# Patient Record
Sex: MALE | Race: WHITE | NOT HISPANIC OR LATINO | Employment: FULL TIME | URBAN - METROPOLITAN AREA
[De-identification: names, ages, dates, MRNs, and addresses within clinical notes are randomized per-mention and may not be internally consistent; named-entity substitution may affect disease eponyms.]

---

## 2024-11-04 ENCOUNTER — APPOINTMENT (OUTPATIENT)
Dept: RADIOLOGY | Facility: CLINIC | Age: 64
End: 2024-11-04
Payer: MEDICARE

## 2024-11-04 DIAGNOSIS — M54.2 CERVICALGIA: ICD-10-CM

## 2024-11-04 PROCEDURE — 72040 X-RAY EXAM NECK SPINE 2-3 VW: CPT

## 2024-11-04 PROCEDURE — 73030 X-RAY EXAM OF SHOULDER: CPT

## 2024-11-22 ENCOUNTER — EVALUATION (OUTPATIENT)
Dept: PHYSICAL THERAPY | Facility: CLINIC | Age: 64
End: 2024-11-22
Payer: MEDICARE

## 2024-11-22 DIAGNOSIS — G89.29 CHRONIC LEFT SHOULDER PAIN: ICD-10-CM

## 2024-11-22 DIAGNOSIS — G89.29 CHRONIC RIGHT SHOULDER PAIN: ICD-10-CM

## 2024-11-22 DIAGNOSIS — M54.2 CERVICALGIA: Primary | ICD-10-CM

## 2024-11-22 DIAGNOSIS — M25.511 CHRONIC RIGHT SHOULDER PAIN: ICD-10-CM

## 2024-11-22 DIAGNOSIS — M25.512 CHRONIC LEFT SHOULDER PAIN: ICD-10-CM

## 2024-11-22 PROCEDURE — 97110 THERAPEUTIC EXERCISES: CPT

## 2024-11-22 PROCEDURE — 97162 PT EVAL MOD COMPLEX 30 MIN: CPT

## 2024-11-22 RX ORDER — LISINOPRIL 20 MG/1
20 TABLET ORAL DAILY
COMMUNITY

## 2024-11-22 NOTE — PROGRESS NOTES
PT Evaluation     Today's date: 2024  Patient name: Catracho Singleton  : 1960  MRN: [id number]  Referring provider: WILLEM George  Dx:   Encounter Diagnosis     ICD-10-CM    1. Cervicalgia  M54.2       2. Chronic left shoulder pain  M25.512     G89.29       3. Chronic right shoulder pain  M25.511     G89.29                      Assessment  Impairments: abnormal or restricted ROM, activity intolerance, impaired physical strength, lacks appropriate home exercise program and pain with function    Assessment details: Catracho Singleton is a pleasant 64 y.o. male who presents with chronic R sided headaches, chronic bilateral shoulder pain, and reports of decreased mobility of his neck beginning about 6 months ago of insidious onset. He presents with decreased cervical extension and bilateral rotation ROM and decreased shoulder flexion and abduction ROM with pain. He had improved cervical extension and rotation ROM following 10 repetitions of cervical retraction in sitting. No other tests were done secondary to time, but will assess BUE strength and ROM, nerve tension, and cervical joint play next visit. These impairments are limiting him with sleeping more than 1-1.5 hours uninterrupted and turning his head while driving. He has a primary movement diagnosis of cervical hypomobility. These signs and symptoms are consistent with cervicalgia, bilateral shoulder pain, and cervicogenic headaches. He will benefit from skilled PT to address impairments and return to PLOF         Prognosis details: Positive prognostic indicators include positive attitude toward recovery, motivated to improve, high self-efficacy, good understanding of condition, realistic expectations.  Negative prognostic indicators include chronicity of symptoms, high symptom irritability    Goals  STG to be achieved in 4 weeks  Patient will have improved cervical extension ROM to 40 degrees.  Improved bilateral cervical rotation ROM to 75  degrees  Evaluate shoulder mobility and strength  Patient will be independent with HEP.    LTG to be achieved in 8 weeks  Patient will be able to turn his head better when driving  Patient will be able to sleep at least 3 hours at night time before pain wakes him up  Patient will be able to do physical labor with his upper body with less pain      Plan  Patient would benefit from: skilled physical therapy  Planned modality interventions: cryotherapy, thermotherapy: hydrocollator packs, TENS and low level laser therapy    Planned therapy interventions: home exercise program, functional ROM exercises, body mechanics training, joint mobilization, manual therapy, neuromuscular re-education, therapeutic exercise, therapeutic activities, stretching, strengthening, flexibility and patient/caregiver education    Frequency: 1-2x/week.  Duration in weeks: 8  Plan of Care beginning date: 11/22/2024  Plan of Care expiration date: 1/17/2025  Treatment plan discussed with: patient      Subjective Evaluation    History of Present Illness  Date of onset: 5/22/2024  Mechanism of injury: Patient reports insidious onset of bilateral shoulder pain about 6 months ago. He denies much neck pain but does note trouble turning his head to look both ways while driving. He drives a lot for work. 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 No issues doing his job but notes not being able to lift like bench pressing, etc like he used to. Most of his pain is at night time. He is unable to sleep more than 1-1.5 hours before pain wakes him up. He notes he will sometimes have a headache that is right sided. He mainly wants to improve his pain so he can get better rest because it impacts his energy throughout the day    Denies numbness/tingling, chest pain, nausea/vomiting, recent infection, recent fever,  changes in vision, difficulty swallowing    11/4/2024 L shoulder x-ray report: Tiny osteophyte of the inferior glenoid margin with preserved glenohumeral joint space.  No fracture or dislocation.  Subtle tiny calcific density adjacent to the humeral head, which may indicate calcific tendinitis    11/4/2024: R shoulder x-ray report: Mild degenerative change of the glenohumeral joint.  Very mild acromioclavicular arthrosis.  No fracture or dislocation.    11/4/2024 cervical spine x-ray: No acute fracture or subluxation.   Reversal normal cervical lordosis with trace anterolisthesis C4-5 and retrolisthesis C5-6 and C6-7.  Advanced disc height loss C5-6 and C6-7 with vertebral body endplate spurring and multilevel facet and uncovertebral hypertrophy.          Patient Goals  Patient goal: Patient wants to relieve his pain so he can get better sleep and also wants to be able to turn his head better for driving  Pain  Relieving factors: change in position    Treatments  No previous or current treatments      Objective     Active Range of Motion   Cervical/Thoracic Spine       Cervical    Flexion: 40 degrees   Extension: 25 degrees      Left rotation: 55 degrees with pain  Right rotation: 55 degrees    with pain    Additional Active Range of Motion Details  Repeated seated cervical retraction:  -improved L rotation to 60 degrees  -improved R rotation to 65 degrees  -improved extension to 30 degrees  Mildly limited shoulder flexion and abduction AROM bilaterally    Strength/Myotome Testing     Additional Strength Details  Strength not tested today - will test next visit            Daily Treatment Diary     Precautions: none  Functional Limitations: sleeping - unable to sleep more than one hour to one and a half hours before being woken up by pain; unable to bench press/lift like he used to  Impairments: cervical extension ROM, bilateral cervical rotation ROM, shoulder flexion and abduction AROM  [employer] Code: [email or web address]    POC expiration: 1/17/2025  FOTO intake: 54  FOTO status:   FOTO predicted by visit 10: 70      POC Expires Reeval for Medicare to be completed  Unit Limit Auth Expiration Date PT/OT/STVisit Limit   1/17/2025 By visit 10 N/a BOMN BOMN    Completed on visit                    Auth Status DATE 11/22        NA Visit # 1         Remaining         MANUAL THERAPY         Supine cervical PA mobs grade 1/2         Supine SOR         Supine cervical distraction                  Prone thoracic mobs grade 2/3                  THERAPEUTIC EXERCISE HEP         UBE?          Seated cervical retraction 11/22 10        Seated cervical retraction ext          Seated thoracic ext w/ half foam roll                    Supine cervical retraction                    Sidelying open book                              Wall slides shld flex?                                                  NEUROMUSCULAR REEDUCATION                                                                                                                                                       THERAPEUTIC ACTIVITY                                                  GAIT TRAINING                                                  MODALITIES

## 2024-11-22 NOTE — LETTER
2024    Es Pascualyeison Chris   [address] [facility] [address]    Patient: Catracho Singleton   YOB: 1960   Date of Visit: 2024     Encounter Diagnosis     ICD-10-CM    1. Cervicalgia  M54.2       2. Chronic left shoulder pain  M25.512     G89.29       3. Chronic right shoulder pain  M25.511     G89.29           Dear Dr. Edison Chris:    Thank you for your recent referral of Catracho Singleton. Please review the attached evaluation summary from Catracho's recent visit.     Please verify that you agree with the plan of care by signing the attached order.     If you have any questions or concerns, please do not hesitate to call.     I sincerely appreciate the opportunity to share in the care of one of your patients and hope to have another opportunity to work with you in the near future.       Sincerely,    Christine Brumfield      Referring Provider:      I certify that I have read the below Plan of Care and certify the need for these services furnished under this plan of treatment while under my care.                    Es Edison Chris DO  [address] [facility] [address]  Via Fax: [phone]          PT Evaluation     Today's date: 2024  Patient name: Catracho Singleton  : 1960  MRN: [id number]  Referring provider: WILLEM George  Dx:   Encounter Diagnosis     ICD-10-CM    1. Cervicalgia  M54.2       2. Chronic left shoulder pain  M25.512     G89.29       3. Chronic right shoulder pain  M25.511     G89.29                      Assessment  Impairments: abnormal or restricted ROM, activity intolerance, impaired physical strength, lacks appropriate home exercise program and pain with function    Assessment details: Catracho Singleton is a pleasant 64 y.o. male who presents with chronic R sided headaches, chronic bilateral shoulder pain, and reports of decreased mobility of his neck beginning about 6 months ago of insidious onset. He  presents with decreased cervical extension and bilateral rotation ROM and decreased shoulder flexion and abduction ROM with pain. He had improved cervical extension and rotation ROM following 10 repetitions of cervical retraction in sitting. No other tests were done secondary to time, but will assess BUE strength and ROM, nerve tension, and cervical joint play next visit. These impairments are limiting him with sleeping more than 1-1.5 hours uninterrupted and turning his head while driving. He has a primary movement diagnosis of cervical hypomobility. These signs and symptoms are consistent with cervicalgia, bilateral shoulder pain, and cervicogenic headaches. He will benefit from skilled PT to address impairments and return to PLOF         Prognosis details: Positive prognostic indicators include positive attitude toward recovery, motivated to improve, high self-efficacy, good understanding of condition, realistic expectations.  Negative prognostic indicators include chronicity of symptoms, high symptom irritability    Goals  STG to be achieved in 4 weeks  Patient will have improved cervical extension ROM to 40 degrees.  Improved bilateral cervical rotation ROM to 75 degrees  Evaluate shoulder mobility and strength  Patient will be independent with HEP.    LTG to be achieved in 8 weeks  Patient will be able to turn his head better when driving  Patient will be able to sleep at least 3 hours at night time before pain wakes him up  Patient will be able to do physical labor with his upper body with less pain      Plan  Patient would benefit from: skilled physical therapy  Planned modality interventions: cryotherapy, thermotherapy: hydrocollator packs, TENS and low level laser therapy    Planned therapy interventions: home exercise program, functional ROM exercises, body mechanics training, joint mobilization, manual therapy, neuromuscular re-education, therapeutic exercise, therapeutic activities, stretching,  strengthening, flexibility and patient/caregiver education    Frequency: 1-2x/week.  Duration in weeks: 8  Plan of Care beginning date: 11/22/2024  Plan of Care expiration date: 1/17/2025  Treatment plan discussed with: patient      Subjective Evaluation    History of Present Illness  Date of onset: 5/22/2024  Mechanism of injury: Patient reports insidious onset of bilateral shoulder pain about 6 months ago. He denies much neck pain but does note trouble turning his head to look both ways while driving. He drives a lot for work. 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 No issues doing his job but notes not being able to lift like bench pressing, etc like he used to. Most of his pain is at night time. He is unable to sleep more than 1-1.5 hours before pain wakes him up. He notes he will sometimes have a headache that is right sided. He mainly wants to improve his pain so he can get better rest because it impacts his energy throughout the day    Denies numbness/tingling, chest pain, nausea/vomiting, recent infection, recent fever, changes in vision, difficulty swallowing    11/4/2024 L shoulder x-ray report: Tiny osteophyte of the inferior glenoid margin with preserved glenohumeral joint space.  No fracture or dislocation.  Subtle tiny calcific density adjacent to the humeral head, which may indicate calcific tendinitis    11/4/2024: R shoulder x-ray report: Mild degenerative change of the glenohumeral joint.  Very mild acromioclavicular arthrosis.  No fracture or dislocation.    11/4/2024 cervical spine x-ray: No acute fracture or subluxation.   Reversal normal cervical lordosis with trace anterolisthesis C4-5 and retrolisthesis C5-6 and C6-7.  Advanced disc height loss C5-6 and C6-7 with vertebral body endplate spurring and multilevel facet and uncovertebral  hypertrophy.          Patient Goals  Patient goal: Patient wants to relieve his pain so he can get better sleep and also wants to be able to turn his head better for driving  Pain  Relieving factors: change in position    Treatments  No previous or current treatments      Objective     Active Range of Motion   Cervical/Thoracic Spine       Cervical    Flexion: 40 degrees   Extension: 25 degrees      Left rotation: 55 degrees with pain  Right rotation: 55 degrees    with pain    Additional Active Range of Motion Details  Repeated seated cervical retraction:  -improved L rotation to 60 degrees  -improved R rotation to 65 degrees  -improved extension to 30 degrees  Mildly limited shoulder flexion and abduction AROM bilaterally    Strength/Myotome Testing     Additional Strength Details  Strength not tested today - will test next visit            Daily Treatment Diary     Precautions: none  Functional Limitations: sleeping - unable to sleep more than one hour to one and a half hours before being woken up by pain; unable to bench press/lift like he used to  Impairments: cervical extension ROM, bilateral cervical rotation ROM, shoulder flexion and abduction AROM  [facility] Code: [email or web address]   POC expiration: 1/17/2025  FOTO intake: 54  FOTO status:   FOTO predicted by visit 10: 70      POC Expires Reeval for Medicare to be completed  Unit Limit Auth Expiration Date PT/OT/STVisit Limit   1/17/2025 By visit 10 N/a BOMN BOMN    Completed on visit                    Auth Status DATE 11/22        NA Visit # 1         Remaining         MANUAL THERAPY         Supine cervical PA mobs grade 1/2         Supine SOR         Supine cervical distraction                  Prone thoracic mobs grade 2/3                  THERAPEUTIC EXERCISE HEP         UBE?          Seated cervical retraction 11/22 10        Seated cervical retraction ext          Seated thoracic ext w/ half foam roll                    Supine cervical retraction                     Sidelying open book                              Wall slides shld flex?                                                  NEUROMUSCULAR REEDUCATION                                                                                                                                                       THERAPEUTIC ACTIVITY                                                  GAIT TRAINING                                                  MODALITIES

## 2024-11-26 ENCOUNTER — EVALUATION (OUTPATIENT)
Dept: PHYSICAL THERAPY | Facility: CLINIC | Age: 64
End: 2024-11-26
Payer: MEDICARE

## 2024-11-26 DIAGNOSIS — G89.29 CHRONIC LEFT SHOULDER PAIN: ICD-10-CM

## 2024-11-26 DIAGNOSIS — M54.2 CERVICALGIA: Primary | ICD-10-CM

## 2024-11-26 DIAGNOSIS — M25.511 CHRONIC RIGHT SHOULDER PAIN: ICD-10-CM

## 2024-11-26 DIAGNOSIS — M25.512 CHRONIC LEFT SHOULDER PAIN: ICD-10-CM

## 2024-11-26 DIAGNOSIS — G89.29 CHRONIC RIGHT SHOULDER PAIN: ICD-10-CM

## 2024-11-26 PROCEDURE — 97110 THERAPEUTIC EXERCISES: CPT

## 2024-11-26 PROCEDURE — 97140 MANUAL THERAPY 1/> REGIONS: CPT

## 2024-11-26 NOTE — LETTER
2024    Es [address] Chris   [address] [facility] [address]    Patient: Catracho Singleton   YOB: 1960   Date of Visit: 2024     Encounter Diagnosis     ICD-10-CM    1. Cervicalgia  M54.2       2. Chronic left shoulder pain  M25.512     G89.29       3. Chronic right shoulder pain  M25.511     G89.29           Dear Dr. Edison Chris:    Thank you for your recent referral of Catracho Singleton. Please review the attached evaluation summary from Catracho's recent visit.     Please verify that you agree with the plan of care by signing the attached order.     If you have any questions or concerns, please do not hesitate to call.     I sincerely appreciate the opportunity to share in the care of one of your patients and hope to have another opportunity to work with you in the near future.       Sincerely,    Christine Brumfield      Referring Provider:      I certify that I have read the below Plan of Care and certify the need for these services furnished under this plan of treatment while under my care.                    Es Edison Chris DO  [address] [facility] [address]  Via Fax: [phone]          PT Re-Evaluation     Today's date: 2024  Patient name: Catracho Singleton  : 1960  MRN: [id number]  Referring provider: WILLEM George  Dx:   Encounter Diagnosis     ICD-10-CM    1. Cervicalgia  M54.2       2. Chronic left shoulder pain  M25.512     G89.29       3. Chronic right shoulder pain  M25.511     G89.29                      Assessment  Impairments: abnormal or restricted ROM, activity intolerance, impaired physical strength, lacks appropriate home exercise program and pain with function    Assessment details: Catracho Singleton is a pleasant 64 y.o. male who presents with chronic R sided headaches, chronic bilateral shoulder pain, and reports of decreased mobility of his neck beginning about 6 months ago of insidious onset. He  presents with decreased cervical extension and bilateral rotation ROM and decreased shoulder flexion and abduction ROM with pain. He had improved cervical extension and rotation ROM following 10 repetitions of cervical retraction in sitting. No other tests were done secondary to time, but will assess BUE strength and ROM, nerve tension, and cervical joint play next visit. These impairments are limiting him with sleeping more than 1-1.5 hours uninterrupted and turning his head while driving. He has a primary movement diagnosis of cervical hypomobility. These signs and symptoms are consistent with cervicalgia, bilateral shoulder pain, and cervicogenic headaches. He will benefit from skilled PT to address impairments and return to PLOF     11/26/2024: Patient had initial PT evaluation on 11/22/2024 and today's re-evaluation is a continuation of the initial evaluation. He reports feeling improved mobility in his neck but notes no change in his shoulder pain. Evaluated bilateral shoulder ROM and strength today. Note normal flexion ROM bilaterally. He has mildly limited and painfree L shoulder ROM. Note painful and limited R shoulder abduction ROM that improved by 30 degrees after AP mobilizations and MWM during abduction. He has weakness in shoulder abduction and ER bilaterally though a greater limitation in R shoulder with reproduction of pain. Cervical ROM improved after manual therapy and repeated cervical extension and thoracic extension. These impairments continue to limit him with sleeping and driving. He will benefit from skilled PT to address impairments and return to PLOF        Prognosis details: Positive prognostic indicators include positive attitude toward recovery, motivated to improve, high self-efficacy, good understanding of condition, realistic expectations.  Negative prognostic indicators include chronicity of symptoms, high symptom irritability    Goals  STG to be achieved in 4 weeks  Patient will  have improved cervical extension ROM to 40 degrees.(Ongoing)  Improved bilateral cervical rotation ROM to 75 degrees(ongoing)  Evaluate shoulder mobility and strength (met)  Patient will be independent with HEP.    LTG to be achieved in 8 weeks (all ongoing)  Patient will be able to turn his head better when driving  Patient will be able to sleep at least 3 hours at night time before pain wakes him up  Patient will be able to do physical labor with his upper body with less pain      Plan  Patient would benefit from: skilled physical therapy  Planned modality interventions: cryotherapy, thermotherapy: hydrocollator packs, TENS and low level laser therapy    Planned therapy interventions: home exercise program, functional ROM exercises, body mechanics training, joint mobilization, manual therapy, neuromuscular re-education, therapeutic exercise, therapeutic activities, stretching, strengthening, flexibility and patient/caregiver education    Frequency: 1-2x/week.  Duration in weeks: 8  Plan of Care beginning date: 11/22/2024  Plan of Care expiration date: 1/17/2025  Treatment plan discussed with: patient        Subjective Evaluation    History of Present Illness  Date of onset: 5/22/2024  Mechanism of injury: Patient reports insidious onset of bilateral shoulder pain about 6 months ago. He denies much neck pain but does note trouble turning his head to look both ways while driving. He drives a lot for work. 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 No issues doing his job but notes not being able to lift like bench pressing, etc like he used to. Most of his pain is at night time. He is unable to sleep more than 1-1.5 hours before pain wakes him up. He notes he will sometimes have a headache that is right sided. He mainly wants to improve his pain so he can get better rest  because it impacts his energy throughout the day    Denies numbness/tingling, chest pain, nausea/vomiting, recent infection, recent fever, changes in vision, difficulty swallowing    11/4/2024 L shoulder x-ray report: Tiny osteophyte of the inferior glenoid margin with preserved glenohumeral joint space.  No fracture or dislocation.  Subtle tiny calcific density adjacent to the humeral head, which may indicate calcific tendinitis    11/4/2024: R shoulder x-ray report: Mild degenerative change of the glenohumeral joint.  Very mild acromioclavicular arthrosis.  No fracture or dislocation.    11/4/2024 cervical spine x-ray: No acute fracture or subluxation.   Reversal normal cervical lordosis with trace anterolisthesis C4-5 and retrolisthesis C5-6 and C6-7.  Advanced disc height loss C5-6 and C6-7 with vertebral body endplate spurring and multilevel facet and uncovertebral hypertrophy.    11/26/2024: Patient reports he has been doing the neck exercise at least 3 times a day since his first PT appointment on 11/22/2024. Notes one night he got 3 hours of sleep before he woke up - notes he wasn't woken up by pain though. He notes the other nights were bad nights of sleep though. Feels the neck exercise does help and he is able to turn his head better      Patient Goals  Patient goal: Patient wants to relieve his pain so he can get better sleep and also wants to be able to turn his head better for driving  Pain  Relieving factors: change in position    Treatments  No previous or current treatments        Objective     Tenderness     Additional Tenderness Details  TTP to post scapula on R and anterior shoulder L     Active Range of Motion   Cervical/Thoracic Spine       Cervical    Flexion: 40 degrees   Extension: 25 degrees      Left rotation: 55 degrees with pain  Right rotation: 55 degrees    with pain  Left Shoulder   Flexion: 170 degrees   Abduction: 150 degrees   External rotation 90°: 78 degrees     Right Shoulder    Flexion: 170 degrees   Abduction: 135 degrees with pain  External rotation 45°: 55 degrees     Additional Active Range of Motion Details  Repeated seated cervical retraction:  -improved L rotation to 60 degrees  -improved R rotation to 65 degrees  -improved extension to 30 degrees  Improved R shoulder abduction AROM to 160 degrees following grade 3/4 AP GHJ mobilizations and GHJ AP MWM during abduction    Joint Play   Left Shoulder  Joints within functional limits are the posterior capsule.     Right Shoulder  Hypomobile in the posterior capsule.     Hypomobile: C2, C3, C4, C5, C6 and C7     Strength/Myotome Testing     Left Shoulder     Planes of Motion   Flexion: 5   Abduction: 4- (pain)   External rotation at 0°: 4 (pain)   Internal rotation at 0°: 5     Right Shoulder     Planes of Motion   Flexion: 5   Abduction: 3+ (pain)   External rotation at 0°: 3+ (pain)   Internal rotation at 0°: 5             Daily Treatment Diary     Precautions: none  Functional Limitations: sleeping - unable to sleep more than one hour to one and a half hours before being woken up by pain; unable to bench press/lift like he used to  Impairments: cervical extension ROM, bilateral cervical rotation ROM, shoulder flexion and abduction AROM  [employer] Code: [email or web address]   POC expiration: 1/17/2025  FOTO intake: 54  FOTO status:   FOTO predicted by visit 10: 70      POC Expires Reeval for Medicare to be completed  Unit Limit Auth Expiration Date PT/OT/STVisit Limit   1/17/2025 By visit 10 N/a BOMN BOMN    Completed on visit                    Auth Status DATE 11/22 11/26       NA Visit # 1 2        Remaining         MANUAL THERAPY         Supine cervical PA mobs grade 1/2  Gr 2/3  5'       Supine SOR  2'       Supine cervical distraction  2'                Prone thoracic mobs grade 2/3  nv                Supine R GHJ AP mob grade 3/4  4'       Supine R GHJ AP MWM abd  2'                THERAPEUTIC EXERCISE HEP         UBE?          Seated  "cervical retraction 11/22 10 10       Seated cervical retraction ext   10       Seated thoracic ext over table   10       Seated R shld abd MWM w/ AP mob   nv       Seated cervical rot SNAG w/ towel   10ea                 Sidelying open book   nv       Sidelying ER   Nv?       Standing scaption iso   Hold, p!       Wall slides shld flex?          Post capsule stretch cross body   nv       Doorway pec stretch   3x20\" ea                           Bent over row   nv       Bent over shld ext   nv       Bent over horz abd   Nv?                 Barbell row                              NEUROMUSCULAR REEDUCATION           Standing shld ER iso   5\"x10                           TB row          TB shld ext          TB ER                                                                                          THERAPEUTIC ACTIVITY                                                  GAIT TRAINING                                                  MODALITIES                                                         "

## 2024-11-26 NOTE — PROGRESS NOTES
PT Re-Evaluation     Today's date: 2024  Patient name: Catracho Singleton  : 1960  MRN: [id number]  Referring provider: WILLEM George  Dx:   Encounter Diagnosis     ICD-10-CM    1. Cervicalgia  M54.2       2. Chronic left shoulder pain  M25.512     G89.29       3. Chronic right shoulder pain  M25.511     G89.29                      Assessment  Impairments: abnormal or restricted ROM, activity intolerance, impaired physical strength, lacks appropriate home exercise program and pain with function    Assessment details: Catracho Singleton is a pleasant 64 y.o. male who presents with chronic R sided headaches, chronic bilateral shoulder pain, and reports of decreased mobility of his neck beginning about 6 months ago of insidious onset. He presents with decreased cervical extension and bilateral rotation ROM and decreased shoulder flexion and abduction ROM with pain. He had improved cervical extension and rotation ROM following 10 repetitions of cervical retraction in sitting. No other tests were done secondary to time, but will assess BUE strength and ROM, nerve tension, and cervical joint play next visit. These impairments are limiting him with sleeping more than 1-1.5 hours uninterrupted and turning his head while driving. He has a primary movement diagnosis of cervical hypomobility. These signs and symptoms are consistent with cervicalgia, bilateral shoulder pain, and cervicogenic headaches. He will benefit from skilled PT to address impairments and return to PLOF     2024: Patient had initial PT evaluation on 2024 and today's re-evaluation is a continuation of the initial evaluation. He reports feeling improved mobility in his neck but notes no change in his shoulder pain. Evaluated bilateral shoulder ROM and strength today. Note normal flexion ROM bilaterally. He has mildly limited and painfree L shoulder ROM. Note painful and limited R shoulder abduction ROM that improved by 30 degrees  after AP mobilizations and MWM during abduction. He has weakness in shoulder abduction and ER bilaterally though a greater limitation in R shoulder with reproduction of pain. Cervical ROM improved after manual therapy and repeated cervical extension and thoracic extension. These impairments continue to limit him with sleeping and driving. He will benefit from skilled PT to address impairments and return to PLOF        Prognosis details: Positive prognostic indicators include positive attitude toward recovery, motivated to improve, high self-efficacy, good understanding of condition, realistic expectations.  Negative prognostic indicators include chronicity of symptoms, high symptom irritability    Goals  STG to be achieved in 4 weeks  Patient will have improved cervical extension ROM to 40 degrees.(Ongoing)  Improved bilateral cervical rotation ROM to 75 degrees(ongoing)  Evaluate shoulder mobility and strength (met)  Patient will be independent with HEP.    LTG to be achieved in 8 weeks (all ongoing)  Patient will be able to turn his head better when driving  Patient will be able to sleep at least 3 hours at night time before pain wakes him up  Patient will be able to do physical labor with his upper body with less pain      Plan  Patient would benefit from: skilled physical therapy  Planned modality interventions: cryotherapy, thermotherapy: hydrocollator packs, TENS and low level laser therapy    Planned therapy interventions: home exercise program, functional ROM exercises, body mechanics training, joint mobilization, manual therapy, neuromuscular re-education, therapeutic exercise, therapeutic activities, stretching, strengthening, flexibility and patient/caregiver education    Frequency: 1-2x/week.  Duration in weeks: 8  Plan of Care beginning date: 11/22/2024  Plan of Care expiration date: 1/17/2025  Treatment plan discussed with: patient        Subjective Evaluation    History of Present Illness  Date of  onset: 5/22/2024  Mechanism of injury: Patient reports insidious onset of bilateral shoulder pain about 6 months ago. He denies much neck pain but does note trouble turning his head to look both ways while driving. He drives a lot for work. 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 No issues doing his job but notes not being able to lift like bench pressing, etc like he used to. Most of his pain is at night time. He is unable to sleep more than 1-1.5 hours before pain wakes him up. He notes he will sometimes have a headache that is right sided. He mainly wants to improve his pain so he can get better rest because it impacts his energy throughout the day    Denies numbness/tingling, chest pain, nausea/vomiting, recent infection, recent fever, changes in vision, difficulty swallowing    11/4/2024 L shoulder x-ray report: Tiny osteophyte of the inferior glenoid margin with preserved glenohumeral joint space.  No fracture or dislocation.  Subtle tiny calcific density adjacent to the humeral head, which may indicate calcific tendinitis    11/4/2024: R shoulder x-ray report: Mild degenerative change of the glenohumeral joint.  Very mild acromioclavicular arthrosis.  No fracture or dislocation.    11/4/2024 cervical spine x-ray: No acute fracture or subluxation.   Reversal normal cervical lordosis with trace anterolisthesis C4-5 and retrolisthesis C5-6 and C6-7.  Advanced disc height loss C5-6 and C6-7 with vertebral body endplate spurring and multilevel facet and uncovertebral hypertrophy.    11/26/2024: Patient reports he has been doing the neck exercise at least 3 times a day since his first PT appointment on 11/22/2024. Notes one night he got 3 hours of sleep before he woke up - notes he wasn't woken up by pain though. He notes the other nights were bad nights of sleep though.  Feels the neck exercise does help and he is able to turn his head better      Patient Goals  Patient goal: Patient wants to relieve his pain so he can get better sleep and also wants to be able to turn his head better for driving  Pain  Relieving factors: change in position    Treatments  No previous or current treatments        Objective     Tenderness     Additional Tenderness Details  TTP to post scapula on R and anterior shoulder L     Active Range of Motion   Cervical/Thoracic Spine       Cervical    Flexion: 40 degrees   Extension: 25 degrees      Left rotation: 55 degrees with pain  Right rotation: 55 degrees    with pain  Left Shoulder   Flexion: 170 degrees   Abduction: 150 degrees   External rotation 90°: 78 degrees     Right Shoulder   Flexion: 170 degrees   Abduction: 135 degrees with pain  External rotation 45°: 55 degrees     Additional Active Range of Motion Details  Repeated seated cervical retraction:  -improved L rotation to 60 degrees  -improved R rotation to 65 degrees  -improved extension to 30 degrees  Improved R shoulder abduction AROM to 160 degrees following grade 3/4 AP GHJ mobilizations and GHJ AP MWM during abduction    Joint Play   Left Shoulder  Joints within functional limits are the posterior capsule.     Right Shoulder  Hypomobile in the posterior capsule.     Hypomobile: C2, C3, C4, C5, C6 and C7     Strength/Myotome Testing     Left Shoulder     Planes of Motion   Flexion: 5   Abduction: 4- (pain)   External rotation at 0°: 4 (pain)   Internal rotation at 0°: 5     Right Shoulder     Planes of Motion   Flexion: 5   Abduction: 3+ (pain)   External rotation at 0°: 3+ (pain)   Internal rotation at 0°: 5             Daily Treatment Diary     Precautions: none  Functional Limitations: sleeping - unable to sleep more than one hour to one and a half hours before being woken up by pain; unable to bench press/lift like he used to  Impairments: cervical extension ROM, bilateral cervical  "rotation ROM, shoulder flexion and abduction AROM  [facility] Code: [email or web address]   POC expiration: 1/17/2025  FOTO intake: 54  FOTO status:   FOTO predicted by visit 10: 70      POC Expires Reeval for Medicare to be completed  Unit Limit Auth Expiration Date PT/OT/STVisit Limit   1/17/2025 By visit 10 N/a BOMN BOMN    Completed on visit                    Auth Status DATE 11/22 11/26       NA Visit # 1 2        Remaining         MANUAL THERAPY         Supine cervical PA mobs grade 1/2  Gr 2/3  5'       Supine SOR  2'       Supine cervical distraction  2'                Prone thoracic mobs grade 2/3  nv                Supine R GHJ AP mob grade 3/4  4'       Supine R GHJ AP MWM abd  2'                THERAPEUTIC EXERCISE HEP         UBE?          Seated cervical retraction 11/22 10 10       Seated cervical retraction ext   10       Seated thoracic ext over table   10       Seated R shld abd MWM w/ AP mob   nv       Seated cervical rot SNAG w/ towel   10ea                 Sidelying open book   nv       Sidelying ER   Nv?       Standing scaption iso   Hold, p!       Wall slides shld flex?          Post capsule stretch cross body   nv       Doorway pec stretch   3x20\" ea                           Bent over row   nv       Bent over shld ext   nv       Bent over horz abd   Nv?                 Barbell row                              NEUROMUSCULAR REEDUCATION           Standing shld ER iso   5\"x10                           TB row          TB shld ext          TB ER                                                                                          THERAPEUTIC ACTIVITY                                                  GAIT TRAINING                                                  MODALITIES                                         "

## 2024-11-29 ENCOUNTER — OFFICE VISIT (OUTPATIENT)
Dept: PHYSICAL THERAPY | Facility: CLINIC | Age: 64
End: 2024-11-29
Payer: MEDICARE

## 2024-11-29 DIAGNOSIS — G89.29 CHRONIC LEFT SHOULDER PAIN: ICD-10-CM

## 2024-11-29 DIAGNOSIS — M25.511 CHRONIC RIGHT SHOULDER PAIN: ICD-10-CM

## 2024-11-29 DIAGNOSIS — M54.2 CERVICALGIA: Primary | ICD-10-CM

## 2024-11-29 DIAGNOSIS — M25.512 CHRONIC LEFT SHOULDER PAIN: ICD-10-CM

## 2024-11-29 DIAGNOSIS — G89.29 CHRONIC RIGHT SHOULDER PAIN: ICD-10-CM

## 2024-11-29 PROCEDURE — 97140 MANUAL THERAPY 1/> REGIONS: CPT

## 2024-11-29 PROCEDURE — 97110 THERAPEUTIC EXERCISES: CPT

## 2024-11-29 NOTE — PROGRESS NOTES
Daily Note     Today's date: 2024  Patient name: Catracho Singleton  : 1960  MRN: [id number]  Referring provider: WILLEM George  Dx:   Encounter Diagnosis     ICD-10-CM    1. Cervicalgia  M54.2       2. Chronic left shoulder pain  M25.512     G89.29       3. Chronic right shoulder pain  M25.511     G89.29                      Subjective: Patient notes he felt fine after last visit. He was doing the stretch in the doorway the other day at home and it made his shoulder very sore - has held off on doing the exercise since.      Objective: See treatment diary below      Assessment: Patient tolerated treatment well overall. R shoulder flexion ROM is painfree WNLs but is limited in abduction primarily. Abduction improved with AP MWM and after AP glides and inferior glides. Added open books to HEP for thoracic mobility. Will benefit from skilled PT to address impairments and return to PLOF      Plan: assess response to HEP; continue progressing cervical ROM and R shoulder ROM     Daily Treatment Diary     Precautions: none  Functional Limitations: sleeping - unable to sleep more than one hour to one and a half hours before being woken up by pain; unable to bench press/lift like he used to  Impairments: cervical extension ROM, bilateral cervical rotation ROM, shoulder flexion and abduction AROM  [employer] Code: [email or web address]   POC expiration: 2025  FOTO intake: 54  FOTO status:   FOTO predicted by visit 10: 70      POC Expires Reeval for Medicare to be completed  Unit Limit Auth Expiration Date PT/OT/STVisit Limit   2025 By visit 10 N/a BOMN BOMN    Completed on visit                    Auth Status DATE       NA Visit # 1 2 3       Remaining         MANUAL THERAPY         Supine cervical PA mobs grade 1/2  Gr 2/3  5' Gr 2/3 5'      Supine SOR  2' 2'      Supine cervical distraction  2' 2'               Prone thoracic mobs grade 2/3  nv                Supine R GHJ AP mob grade 3/4   "4' 4' and inf glide      Supine R GHJ AP MWM abd  2' 4'               THERAPEUTIC EXERCISE HEP         UBE?          Seated cervical retraction 11/22 10 10       Seated cervical retraction ext   10       Seated thoracic ext over table   10       Seated R shld abd MWM w/ AP mob   nv       Seated cervical rot SNAG w/ towel   10ea                 Sidelying open book 11/29  nv 10ea      Sidelying ER   Nv? 2x10      Standing scaption iso   Hold, p!       Wall slides shld flex?          Post capsule stretch cross body   nv       Doorway pec stretch   3x20\" ea 2x20\"ea                           Bent over row   nv 15# 2x10      Bent over shld ext   nv nv      Bent over horz abd   Nv? nv                Barbell row                              NEUROMUSCULAR REEDUCATION           Standing shld ER iso   5\"x10 5\"x10                          TB row          TB shld ext          TB ER                                                                                          THERAPEUTIC ACTIVITY                                                  GAIT TRAINING                                                  MODALITIES                                        "

## 2024-12-03 ENCOUNTER — OFFICE VISIT (OUTPATIENT)
Dept: PHYSICAL THERAPY | Facility: CLINIC | Age: 64
End: 2024-12-03
Payer: MEDICARE

## 2024-12-03 DIAGNOSIS — M25.511 CHRONIC RIGHT SHOULDER PAIN: ICD-10-CM

## 2024-12-03 DIAGNOSIS — M54.2 CERVICALGIA: Primary | ICD-10-CM

## 2024-12-03 DIAGNOSIS — G89.29 CHRONIC RIGHT SHOULDER PAIN: ICD-10-CM

## 2024-12-03 DIAGNOSIS — G89.29 CHRONIC LEFT SHOULDER PAIN: ICD-10-CM

## 2024-12-03 DIAGNOSIS — M25.512 CHRONIC LEFT SHOULDER PAIN: ICD-10-CM

## 2024-12-03 PROCEDURE — 97110 THERAPEUTIC EXERCISES: CPT

## 2024-12-03 PROCEDURE — 97140 MANUAL THERAPY 1/> REGIONS: CPT

## 2024-12-03 NOTE — PROGRESS NOTES
Daily Note     Today's date: 12/3/2024  Patient name: Catracho Singleton  : 1960  MRN: [id number]  Referring provider: WILLEM George  Dx:   Encounter Diagnosis     ICD-10-CM    1. Cervicalgia  M54.2       2. Chronic left shoulder pain  M25.512     G89.29       3. Chronic right shoulder pain  M25.511     G89.29                      Subjective: Patient notes exercises are going better at home. He notes his neck is turning better but he is most limited by his R shoulder at this time. Notes last night was a rough night of sleep with the shoulder bothering him      Objective: See treatment diary below      Assessment: Patient tolerated treatment well overall. Improved R shoulder ROM after AP GHJ glides and MWM during abduction. Also had improved ROM following inferior glides. Initiated more scapular strengthening today. He will benefit from skilled PT to address impairments and return to PLOF      Plan: progress R shoulder ROM and scapular strength     Daily Treatment Diary     Precautions: none  Functional Limitations: sleeping - unable to sleep more than one hour to one and a half hours before being woken up by pain; unable to bench press/lift like he used to  Impairments: cervical extension ROM, bilateral cervical rotation ROM, shoulder flexion and abduction AROM  [facility] Code[facility] [email or web address]   POC expiration: 2025  FOTO intake: 54  FOTO status:   FOTO predicted by visit 10: 70      POC Expires Reeval for Medicare to be completed  Unit Limit Auth Expiration Date PT/OT/STVisit Limit   2025 By visit 10 N/a BOMN BOMN    Completed on visit                    Auth Status DATE  12/3     NA Visit # 1 2 3 4      Remaining         MANUAL THERAPY         Supine cervical PA mobs grade 1/2  Gr 2/3  5' Gr 2/3 5'      Supine SOR  2' 2'      Supine cervical distraction  2' 2'               Prone thoracic mobs grade 2/3  nv       Supine R shoulder PROM    4'     Supine R GHJ AP mob grade 3/4  4'  "4' and inf glide 5' and inf glide     Supine R GHJ AP MWM abd  2' 4' 4'              THERAPEUTIC EXERCISE HEP         UBE?          Seated cervical retraction 11/22 10 10       Seated cervical retraction ext   10       Seated thoracic ext over table   10       Seated R shld abd MWM w/ AP mob   nv       Seated cervical rot SNAG w/ towel   10ea                 Sidelying open book 11/29  nv 10ea 10ea     Sidelying ER   Nv? 2x10 2x10     Sidelying abd     2x10     Standing scaption iso   Hold, p!       Wall slides shld flex?          Post capsule stretch cross body   nv       Doorway pec stretch   3x20\" ea 2x20\"ea                           Bent over row   nv 15# 2x10 15# 2x10     Bent over shld ext   nv nv 7.5# 2x10     Bent over horz abd   Nv? nv      CC lat pulldown     110# 2x10     CC row     88# 2x10     Barbell row                              NEUROMUSCULAR REEDUCATION           Standing shld ER iso   5\"x10 5\"x10                          TB row          TB shld ext          TB ER          TB horz abd     MTB 2x10                                                                           THERAPEUTIC ACTIVITY                                                  GAIT TRAINING                                                  MODALITIES                                        "

## 2025-01-20 ENCOUNTER — APPOINTMENT (OUTPATIENT)
Dept: PHYSICAL THERAPY | Facility: CLINIC | Age: 65
End: 2025-01-20
Payer: MEDICARE

## 2025-01-23 ENCOUNTER — EVALUATION (OUTPATIENT)
Dept: PHYSICAL THERAPY | Facility: CLINIC | Age: 65
End: 2025-01-23
Payer: MEDICARE

## 2025-01-23 DIAGNOSIS — G89.29 CHRONIC RIGHT SHOULDER PAIN: Primary | ICD-10-CM

## 2025-01-23 DIAGNOSIS — G89.29 CHRONIC LEFT SHOULDER PAIN: ICD-10-CM

## 2025-01-23 DIAGNOSIS — M25.512 CHRONIC LEFT SHOULDER PAIN: ICD-10-CM

## 2025-01-23 DIAGNOSIS — M54.2 CERVICALGIA: ICD-10-CM

## 2025-01-23 DIAGNOSIS — M25.511 CHRONIC RIGHT SHOULDER PAIN: Primary | ICD-10-CM

## 2025-01-23 PROCEDURE — 97110 THERAPEUTIC EXERCISES: CPT

## 2025-01-23 PROCEDURE — 97140 MANUAL THERAPY 1/> REGIONS: CPT

## 2025-01-23 NOTE — PROGRESS NOTES
PT Re-Evaluation     Today's date: 2025  Patient name: Catracho Singleton  : 1960  MRN: [id number]  Referring provider: WILLEM George  Dx:   Encounter Diagnosis     ICD-10-CM    1. Chronic right shoulder pain  M25.511     G89.29       2. Chronic left shoulder pain  M25.512     G89.29       3. Cervicalgia  M54.2                        Assessment  Impairments: abnormal or restricted ROM, activity intolerance, impaired physical strength, lacks appropriate home exercise program and pain with function    Assessment details: Catracho Singleton is a pleasant 64 y.o. male who presents with chronic R sided headaches, chronic bilateral shoulder pain, and reports of decreased mobility of his neck beginning about 6 months ago of insidious onset. He presents with decreased cervical extension and bilateral rotation ROM and decreased shoulder flexion and abduction ROM with pain. He had improved cervical extension and rotation ROM following 10 repetitions of cervical retraction in sitting. No other tests were done secondary to time, but will assess BUE strength and ROM, nerve tension, and cervical joint play next visit. These impairments are limiting him with sleeping more than 1-1.5 hours uninterrupted and turning his head while driving. He has a primary movement diagnosis of cervical hypomobility. These signs and symptoms are consistent with cervicalgia, bilateral shoulder pain, and cervicogenic headaches. He will benefit from skilled PT to address impairments and return to PLOF     2024: Patient had initial PT evaluation on 2024 and today's re-evaluation is a continuation of the initial evaluation. He reports feeling improved mobility in his neck but notes no change in his shoulder pain. Evaluated bilateral shoulder ROM and strength today. Note normal flexion ROM bilaterally. He has mildly limited and painfree L shoulder ROM. Note painful and limited R shoulder abduction ROM that improved by 30  degrees after AP mobilizations and MWM during abduction. He has weakness in shoulder abduction and ER bilaterally though a greater limitation in R shoulder with reproduction of pain. Cervical ROM improved after manual therapy and repeated cervical extension and thoracic extension. These impairments continue to limit him with sleeping and driving. He will benefit from skilled PT to address impairments and return to PLOF    1/23/2025: Patient is resuming PT after a break in treatment due to work schedule. Re-assessed cervical spine and bilateral shoulders. He presents with decreased cervical ROM and decreased bilateral shoulder ROM and strength. Though he had a lapse in treatment, his ROM and strength is not any worse than it was upon IE. His impairments are limiting him with sleeping, turning his head while driving, and doing heavier lifting. He will benefit from skilled PT to address impairments and return to PLOF. He was reminded about the referral that I placed for him to see Dr. Casey for a consult for his shoulders.    Prognosis details: Positive prognostic indicators include positive attitude toward recovery, motivated to improve, high self-efficacy, good understanding of condition, realistic expectations.  Negative prognostic indicators include chronicity of symptoms, high symptom irritability    Goals  STG to be achieved in 4 weeks from 1/23  Patient will have improved cervical extension ROM to 40 degrees.(Ongoing)  Improved bilateral cervical rotation ROM to 75 degrees(ongoing)  Evaluate shoulder mobility and strength (met)  Patient will be independent with HEP.    LTG to be achieved in 8 weeks (all ongoing) - due to break in PT, continue all goals from 1/23  Patient will be able to turn his head better when driving  Patient will be able to sleep at least 3 hours at night time before pain wakes him up  Patient will be able to do physical labor with his upper body with less pain      Plan  Patient would  benefit from: skilled physical therapy  Planned modality interventions: cryotherapy, thermotherapy: hydrocollator packs, TENS and low level laser therapy    Planned therapy interventions: home exercise program, functional ROM exercises, body mechanics training, joint mobilization, manual therapy, neuromuscular re-education, therapeutic exercise, therapeutic activities, stretching, strengthening, flexibility and patient/caregiver education    Frequency: 1-2x week  Duration in weeks: 8  Plan of Care beginning date: 1/23/2025  Plan of Care expiration date: 3/20/2025  Treatment plan discussed with: patient and PTA        Subjective Evaluation    History of Present Illness  Date of onset: 5/22/2024  Mechanism of injury: Patient reports insidious onset of bilateral shoulder pain about 6 months ago. He denies much neck pain but does note trouble turning his head to look both ways while driving. He drives a lot for work. 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 No issues doing his job but notes not being able to lift like bench pressing, etc like he used to. Most of his pain is at night time. He is unable to sleep more than 1-1.5 hours before pain wakes him up. He notes he will sometimes have a headache that is right sided. He mainly wants to improve his pain so he can get better rest because it impacts his energy throughout the day    Denies numbness/tingling, chest pain, nausea/vomiting, recent infection, recent fever, changes in vision, difficulty swallowing    11/4/2024 L shoulder x-ray report: Tiny osteophyte of the inferior glenoid margin with preserved glenohumeral joint space.  No fracture or dislocation.  Subtle tiny calcific density adjacent to the humeral head, which may indicate calcific tendinitis    11/4/2024: R shoulder x-ray report: Mild degenerative change of the  glenohumeral joint.  Very mild acromioclavicular arthrosis.  No fracture or dislocation.    11/4/2024 cervical spine x-ray: No acute fracture or subluxation.   Reversal normal cervical lordosis with trace anterolisthesis C4-5 and retrolisthesis C5-6 and C6-7.  Advanced disc height loss C5-6 and C6-7 with vertebral body endplate spurring and multilevel facet and uncovertebral hypertrophy.    11/26/2024: Patient reports he has been doing the neck exercise at least 3 times a day since his first PT appointment on 11/22/2024. Notes one night he got 3 hours of sleep before he woke up - notes he wasn't woken up by pain though. He notes the other nights were bad nights of sleep though. Feels the neck exercise does help and he is able to turn his head better    1/23/2025: Patient notes he has been doing the exercises most days. He notes still having most shoulder pain when trying to sleep at night time. During the day he is able to do everything. Has not yet scheduled a consult with Dr. Casey as he forgot. Held on PT for a few weeks due to not knowing his work schedule in advance  Patient Goals  Patient goal: Patient wants to relieve his pain so he can get better sleep and also wants to be able to turn his head better for driving  Pain  At best pain rating: 3  Relieving factors: change in position    Treatments  No previous or current treatments        Objective     Tenderness     Additional Tenderness Details  TTP to post scapula on R and anterior shoulder L     Active Range of Motion   Cervical/Thoracic Spine       Cervical    Flexion: 50 degrees   Extension: 30 degrees      Left rotation: 65 degrees with pain  Right rotation: 60 degrees    with pain  Left Shoulder   Flexion: 170 degrees   Abduction: 155 degrees   External rotation 45°: 60 degrees   External rotation 90°: 78 degrees     Right Shoulder   Flexion: 170 degrees   Abduction: 145 degrees with pain  External rotation 45°: 60 degrees     Additional Active Range of  Motion Details  Improved R shoulder abduction AROM to 160 degrees following grade 3/4 AP GHJ mobilizations and GHJ AP MWM during abduction    Joint Play   Left Shoulder  Joints within functional limits are the posterior capsule.     Right Shoulder  Hypomobile in the posterior capsule.     Hypomobile: C2, C3, C4, C5, C6 and C7     Strength/Myotome Testing     Left Shoulder     Planes of Motion   Flexion: 5   Abduction: 4- (pain)   External rotation at 0°: 4 (pain)   Internal rotation at 0°: 5     Right Shoulder     Planes of Motion   Flexion: 4   Abduction: 3+ (pain)   External rotation at 0°: 3+ (pain)   Internal rotation at 0°: 5             Daily Treatment Diary     Precautions: none  Functional Limitations: sleeping - unable to sleep more than one hour to one and a half hours before being woken up by pain; unable to bench press/lift like he used to  Impairments: cervical extension ROM, bilateral cervical rotation ROM, shoulder flexion and abduction AROM  [facility] Code: [email or web address]   POC expiration: 3/20/2025  FOTO intake: 54  FOTO status:   FOTO predicted by visit 10: 70      POC Expires Reeval for Medicare to be completed  Unit Limit Auth Expiration Date PT/OT/STVisit Limit   3/20/2025 By visit 15 N/a BOMN BOMN    Completed on visit 5                   Auth Status DATE 11/29 12/3 1/23       NA Visit # 3 4 5        Remaining          MANUAL THERAPY          Supine cervical PA mobs grade 1/2 Gr 2/3 5'         Supine SOR 2'         Supine cervical distraction 2'                   Prone thoracic mobs grade 2/3          Supine R shoulder PROM  4' L/R 12'       Supine R GHJ AP mob grade 3/4 4' and inf glide 5' and inf glide L/R 8'       Supine R GHJ AP MWM abd 4' 4'                  THERAPEUTIC EXERCISE HEP          UBE?           Seated cervical retraction 11/22          Seated cervical retraction ext           Seated thoracic ext over table           Seated R shld abd MWM w/ AP mob           Seated cervical rot SNAG  "w/ towel                      Sidelying open book 11/29 10ea 10ea        Sidelying ER  2x10 2x10        Sidelying abd   2x10        Standing scaption iso           Wall slides shld flex?           Post capsule stretch cross body           Doorway pec stretch  2x20\"ea                                Bent over row  15# 2x10 15# 2x10        Bent over shld ext  nv 7.5# 2x10        Bent over horz abd  nv         CC lat pulldown   110# 2x10        CC row   88# 2x10        Barbell row                                 NEUROMUSCULAR REEDUCATION            Standing shld ER iso  5\"x10                               TB row           TB shld ext           TB ER           TB horz abd   MTB 2x10                                                                                     THERAPEUTIC ACTIVITY                                                       GAIT TRAINING                                                       MODALITIES                                              "

## 2025-01-23 NOTE — LETTER
2025    Es Pascualteradavidlyn Chris   [address] [facility] [address]    Patient: Catracho Singleton   YOB: 1960   Date of Visit: 2025     Encounter Diagnosis     ICD-10-CM    1. Chronic right shoulder pain  M25.511     G89.29       2. Chronic left shoulder pain  M25.512     G89.29       3. Cervicalgia  M54.2           Dear Dr. Edison Chris:    Thank you for your recent referral of Catracho iSngleton. Please review the attached evaluation summary from Catracho's recent visit.     Please verify that you agree with the plan of care by signing the attached order.     If you have any questions or concerns, please do not hesitate to call.     I sincerely appreciate the opportunity to share in the care of one of your patients and hope to have another opportunity to work with you in the near future.       Sincerely,    Christine Brumfield      Referring Provider:      I certify that I have read the below Plan of Care and certify the need for these services furnished under this plan of treatment while under my care.                    Es Edison Chris   [address] [facility] [address]  Via Fax: [phone]          PT Re-Evaluation     Today's date: 2025  Patient name: Catracho Singleton  : 1960  MRN: [id number]  Referring provider: WILLEM George  Dx:   Encounter Diagnosis     ICD-10-CM    1. Chronic right shoulder pain  M25.511     G89.29       2. Chronic left shoulder pain  M25.512     G89.29       3. Cervicalgia  M54.2                        Assessment  Impairments: abnormal or restricted ROM, activity intolerance, impaired physical strength, lacks appropriate home exercise program and pain with function    Assessment details: Catracho Singleton is a pleasant 64 y.o. male who presents with chronic R sided headaches, chronic bilateral shoulder pain, and reports of decreased mobility of his neck beginning about 6 months ago of insidious onset. He  presents with decreased cervical extension and bilateral rotation ROM and decreased shoulder flexion and abduction ROM with pain. He had improved cervical extension and rotation ROM following 10 repetitions of cervical retraction in sitting. No other tests were done secondary to time, but will assess BUE strength and ROM, nerve tension, and cervical joint play next visit. These impairments are limiting him with sleeping more than 1-1.5 hours uninterrupted and turning his head while driving. He has a primary movement diagnosis of cervical hypomobility. These signs and symptoms are consistent with cervicalgia, bilateral shoulder pain, and cervicogenic headaches. He will benefit from skilled PT to address impairments and return to PLOF     11/26/2024: Patient had initial PT evaluation on 11/22/2024 and today's re-evaluation is a continuation of the initial evaluation. He reports feeling improved mobility in his neck but notes no change in his shoulder pain. Evaluated bilateral shoulder ROM and strength today. Note normal flexion ROM bilaterally. He has mildly limited and painfree L shoulder ROM. Note painful and limited R shoulder abduction ROM that improved by 30 degrees after AP mobilizations and MWM during abduction. He has weakness in shoulder abduction and ER bilaterally though a greater limitation in R shoulder with reproduction of pain. Cervical ROM improved after manual therapy and repeated cervical extension and thoracic extension. These impairments continue to limit him with sleeping and driving. He will benefit from skilled PT to address impairments and return to PLOF    1/23/2025: Patient is resuming PT after a break in treatment due to work schedule. Re-assessed cervical spine and bilateral shoulders. He presents with decreased cervical ROM and decreased bilateral shoulder ROM and strength. Though he had a lapse in treatment, his ROM and strength is not any worse than it was upon IE. His impairments are  limiting him with sleeping, turning his head while driving, and doing heavier lifting. He will benefit from skilled PT to address impairments and return to PLOF. He was reminded about the referral that I placed for him to see Dr. Casey for a consult for his shoulders.    Prognosis details: Positive prognostic indicators include positive attitude toward recovery, motivated to improve, high self-efficacy, good understanding of condition, realistic expectations.  Negative prognostic indicators include chronicity of symptoms, high symptom irritability    Goals  STG to be achieved in 4 weeks from 1/23  Patient will have improved cervical extension ROM to 40 degrees.(Ongoing)  Improved bilateral cervical rotation ROM to 75 degrees(ongoing)  Evaluate shoulder mobility and strength (met)  Patient will be independent with HEP.    LTG to be achieved in 8 weeks (all ongoing) - due to break in PT, continue all goals from 1/23  Patient will be able to turn his head better when driving  Patient will be able to sleep at least 3 hours at night time before pain wakes him up  Patient will be able to do physical labor with his upper body with less pain      Plan  Patient would benefit from: skilled physical therapy  Planned modality interventions: cryotherapy, thermotherapy: hydrocollator packs, TENS and low level laser therapy    Planned therapy interventions: home exercise program, functional ROM exercises, body mechanics training, joint mobilization, manual therapy, neuromuscular re-education, therapeutic exercise, therapeutic activities, stretching, strengthening, flexibility and patient/caregiver education    Frequency: 1-2x week  Duration in weeks: 8  Plan of Care beginning date: 1/23/2025  Plan of Care expiration date: 3/20/2025  Treatment plan discussed with: patient and PTA        Subjective Evaluation    History of Present Illness  Date of onset: 5/22/2024  Mechanism of injury: Patient reports insidious onset of bilateral  shoulder pain about 6 months ago. He denies much neck pain but does note trouble turning his head to look both ways while driving. He drives a lot for work. 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 No issues doing his job but notes not being able to lift like bench pressing, etc like he used to. Most of his pain is at night time. He is unable to sleep more than 1-1.5 hours before pain wakes him up. He notes he will sometimes have a headache that is right sided. He mainly wants to improve his pain so he can get better rest because it impacts his energy throughout the day    Denies numbness/tingling, chest pain, nausea/vomiting, recent infection, recent fever, changes in vision, difficulty swallowing    11/4/2024 L shoulder x-ray report: Tiny osteophyte of the inferior glenoid margin with preserved glenohumeral joint space.  No fracture or dislocation.  Subtle tiny calcific density adjacent to the humeral head, which may indicate calcific tendinitis    11/4/2024: R shoulder x-ray report: Mild degenerative change of the glenohumeral joint.  Very mild acromioclavicular arthrosis.  No fracture or dislocation.    11/4/2024 cervical spine x-ray: No acute fracture or subluxation.   Reversal normal cervical lordosis with trace anterolisthesis C4-5 and retrolisthesis C5-6 and C6-7.  Advanced disc height loss C5-6 and C6-7 with vertebral body endplate spurring and multilevel facet and uncovertebral hypertrophy.    11/26/2024: Patient reports he has been doing the neck exercise at least 3 times a day since his first PT appointment on 11/22/2024. Notes one night he got 3 hours of sleep before he woke up - notes he wasn't woken up by pain though. He notes the other nights were bad nights of sleep though. Feels the neck exercise does help and he is able to turn his head  better    1/23/2025: Patient notes he has been doing the exercises most days. He notes still having most shoulder pain when trying to sleep at night time. During the day he is able to do everything. Has not yet scheduled a consult with Dr. Casey as he forgot. Held on PT for a few weeks due to not knowing his work schedule in advance  Patient Goals  Patient goal: Patient wants to relieve his pain so he can get better sleep and also wants to be able to turn his head better for driving  Pain  At best pain rating: 3  Relieving factors: change in position    Treatments  No previous or current treatments        Objective     Tenderness     Additional Tenderness Details  TTP to post scapula on R and anterior shoulder L     Active Range of Motion   Cervical/Thoracic Spine       Cervical    Flexion: 50 degrees   Extension: 30 degrees      Left rotation: 65 degrees with pain  Right rotation: 60 degrees    with pain  Left Shoulder   Flexion: 170 degrees   Abduction: 155 degrees   External rotation 45°: 60 degrees   External rotation 90°: 78 degrees     Right Shoulder   Flexion: 170 degrees   Abduction: 145 degrees with pain  External rotation 45°: 60 degrees     Additional Active Range of Motion Details  Improved R shoulder abduction AROM to 160 degrees following grade 3/4 AP GHJ mobilizations and GHJ AP MWM during abduction    Joint Play   Left Shoulder  Joints within functional limits are the posterior capsule.     Right Shoulder  Hypomobile in the posterior capsule.     Hypomobile: C2, C3, C4, C5, C6 and C7     Strength/Myotome Testing     Left Shoulder     Planes of Motion   Flexion: 5   Abduction: 4- (pain)   External rotation at 0°: 4 (pain)   Internal rotation at 0°: 5     Right Shoulder     Planes of Motion   Flexion: 4   Abduction: 3+ (pain)   External rotation at 0°: 3+ (pain)   Internal rotation at 0°: 5             Daily Treatment Diary     Precautions: none  Functional Limitations: sleeping - unable to sleep more  "than one hour to one and a half hours before being woken up by pain; unable to bench press/lift like he used to  Impairments: cervical extension ROM, bilateral cervical rotation ROM, shoulder flexion and abduction AROM  [facility] Code: [email or web address]   POC expiration: 3/20/2025  FOTO intake: 54  FOTO status:   FOTO predicted by visit 10: 70      POC Expires Reeval for Medicare to be completed  Unit Limit Auth Expiration Date PT/OT/STVisit Limit   3/20/2025 By visit 15 N/a BOMN BOMN    Completed on visit 5                   Auth Status DATE 11/29 12/3 1/23       NA Visit # 3 4 5        Remaining          MANUAL THERAPY          Supine cervical PA mobs grade 1/2 Gr 2/3 5'         Supine SOR 2'         Supine cervical distraction 2'                   Prone thoracic mobs grade 2/3          Supine R shoulder PROM  4' L/R 12'       Supine R GHJ AP mob grade 3/4 4' and inf glide 5' and inf glide L/R 8'       Supine R GHJ AP MWM abd 4' 4'                  THERAPEUTIC EXERCISE HEP          UBE?           Seated cervical retraction 11/22          Seated cervical retraction ext           Seated thoracic ext over table           Seated R shld abd MWM w/ AP mob           Seated cervical rot SNAG w/ towel                      Sidelying open book 11/29 10ea 10ea        Sidelying ER  2x10 2x10        Sidelying abd   2x10        Standing scaption iso           Wall slides shld flex?           Post capsule stretch cross body           Doorway pec stretch  2x20\"ea                                Bent over row  15# 2x10 15# 2x10        Bent over shld ext  nv 7.5# 2x10        Bent over horz abd  nv         CC lat pulldown   110# 2x10        CC row   88# 2x10        Barbell row                                 NEUROMUSCULAR REEDUCATION            Standing shld ER iso  5\"x10                               TB row           TB shld ext           TB ER           TB horz abd   MTB 2x10                                                                    "                  THERAPEUTIC ACTIVITY                                                       GAIT TRAINING                                                       MODALITIES

## 2025-01-28 ENCOUNTER — OFFICE VISIT (OUTPATIENT)
Dept: PHYSICAL THERAPY | Facility: CLINIC | Age: 65
End: 2025-01-28
Payer: MEDICARE

## 2025-01-28 DIAGNOSIS — M25.512 CHRONIC LEFT SHOULDER PAIN: ICD-10-CM

## 2025-01-28 DIAGNOSIS — M25.511 CHRONIC RIGHT SHOULDER PAIN: Primary | ICD-10-CM

## 2025-01-28 DIAGNOSIS — G89.29 CHRONIC RIGHT SHOULDER PAIN: Primary | ICD-10-CM

## 2025-01-28 DIAGNOSIS — G89.29 CHRONIC LEFT SHOULDER PAIN: ICD-10-CM

## 2025-01-28 PROCEDURE — 97140 MANUAL THERAPY 1/> REGIONS: CPT

## 2025-01-28 PROCEDURE — 97110 THERAPEUTIC EXERCISES: CPT

## 2025-01-28 NOTE — PROGRESS NOTES
Daily Note     Today's date: 2025  Patient name: Catracho Singleton  : 1960  MRN: [id number]  Referring provider: WILLEM George  Dx:   Encounter Diagnosis     ICD-10-CM    1. Chronic right shoulder pain  M25.511     G89.29       2. Chronic left shoulder pain  M25.512     G89.29                      Subjective: Pt reports he is becoming very frustrated with his shoulder pain as he has been unable to sleep well for quite some time now.       Objective: See treatment diary below      Assessment: Tolerated treatment well. Pt found benefit from extended manual therapy to stretch out b/l shoulders with improved performance of the TE's following. Patient demonstrated fatigue post treatment, exhibited good technique with therapeutic exercises, and would benefit from continued PT      Plan: Continue per plan of care.  Progress treatment as tolerated.       Daily Treatment Diary     Precautions: none  Functional Limitations: sleeping - unable to sleep more than one hour to one and a half hours before being woken up by pain; unable to bench press/lift like he used to  Impairments: cervical extension ROM, bilateral cervical rotation ROM, shoulder flexion and abduction AROM  [facility] [email or web address]   POC expiration: 3/20/2025  FOTO intake: 54  FOTO status:   FOTO predicted by visit 10: 70      POC Expires Reeval for Medicare to be completed  Unit Limit Auth Expiration Date PT/OT/STVisit Limit   3/20/2025 By visit 15 N/a BOMN BOMN    Completed on visit 5                   Auth Status DATE 11/29 12/3 1/23 1/28      NA Visit # 3 4 5 6       Remaining          MANUAL THERAPY          Supine cervical PA mobs grade 1/2 Gr 2/3 5'         Supine SOR 2'         Supine cervical distraction 2'                   Prone thoracic mobs grade 2/3          Supine R shoulder PROM  4' L/R 12' B/l 20'      Supine R GHJ AP mob grade 3/4 4' and inf glide 5' and inf glide L/R 8'       Supine R GHJ AP MWM abd 4' 4'                 "  THERAPEUTIC EXERCISE HEP          UBE?           Seated cervical retraction 11/22          Seated cervical retraction ext           Seated thoracic ext over table           Seated R shld abd MWM w/ AP mob           Seated cervical rot SNAG w/ towel                      Sidelying open book 11/29 10ea 10ea  X10 ea      Sidelying ER  2x10 2x10  X10 ea      Sidelying abd   2x10  X10 ea      Standing scaption iso           Wall slides shld flex?           Post capsule stretch cross body           Doorway pec stretch  2x20\"ea                                Bent over row  15# 2x10 15# 2x10  15#  10x2 ea      Bent over shld ext  nv 7.5# 2x10  7#  10x2 ea      Bent over horz abd  nv         CC lat pulldown   110# 2x10  110#  10x2      CC row   88# 2x10  110#  10x2      Barbell row                                 NEUROMUSCULAR REEDUCATION            Standing shld ER iso  5\"x10                               TB row           TB shld ext           TB ER           TB horz abd   MTB 2x10                                                                                     THERAPEUTIC ACTIVITY                                                       GAIT TRAINING                                                       MODALITIES                                             "

## 2025-01-30 ENCOUNTER — OFFICE VISIT (OUTPATIENT)
Dept: PHYSICAL THERAPY | Facility: CLINIC | Age: 65
End: 2025-01-30
Payer: MEDICARE

## 2025-01-30 DIAGNOSIS — G89.29 CHRONIC LEFT SHOULDER PAIN: ICD-10-CM

## 2025-01-30 DIAGNOSIS — M25.512 CHRONIC LEFT SHOULDER PAIN: ICD-10-CM

## 2025-01-30 DIAGNOSIS — M25.511 CHRONIC RIGHT SHOULDER PAIN: Primary | ICD-10-CM

## 2025-01-30 DIAGNOSIS — G89.29 CHRONIC RIGHT SHOULDER PAIN: Primary | ICD-10-CM

## 2025-01-30 PROCEDURE — 97110 THERAPEUTIC EXERCISES: CPT

## 2025-01-30 PROCEDURE — 97140 MANUAL THERAPY 1/> REGIONS: CPT

## 2025-01-30 NOTE — PROGRESS NOTES
Daily Note     Today's date: 2025  Patient name: Catracho Singleton  : 1960  MRN: [id number]  Referring provider: WILLEM George  Dx:   Encounter Diagnosis     ICD-10-CM    1. Chronic right shoulder pain  M25.511     G89.29       2. Chronic left shoulder pain  M25.512     G89.29                      Subjective: Pt reports he slept much better the night of his last visit.     Objective: See treatment diary below      Assessment: Tolerated treatment well. Relief with sleeping was noted after extensive stretching LV so same was replicated again today with some light strengthening after, which he tolerated well. Patient demonstrated fatigue post treatment, exhibited good technique with therapeutic exercises, and would benefit from continued PT      Plan: Continue per plan of care.  Progress treatment as tolerated.       Daily Treatment Diary     Precautions: none  Functional Limitations: sleeping - unable to sleep more than one hour to one and a half hours before being woken up by pain; unable to bench press/lift like he used to  Impairments: cervical extension ROM, bilateral cervical rotation ROM, shoulder flexion and abduction AROM  [facility] Code: [email or web address]   POC expiration: 3/20/2025  FOTO intake: 54  FOTO status:   FOTO predicted by visit 10: 70      POC Expires Reeval for Medicare to be completed  Unit Limit Auth Expiration Date PT/OT/STVisit Limit   3/20/2025 By visit 15 N/a BOMN BOMN    Completed on visit 5                   Auth Status DATE 11/29 12/3 1/23 1/28 1/30     NA Visit # 3 4 5 6 7      Remaining          MANUAL THERAPY          Supine cervical PA mobs grade 1/2 Gr 2/3 5'         Supine SOR 2'         Supine cervical distraction 2'                   Prone thoracic mobs grade 2/3          Supine R shoulder PROM  4' L/R 12' B/l 20' B/l 20'     Supine R GHJ AP mob grade 3/4 4' and inf glide 5' and inf glide L/R 8'       Supine R GHJ AP MWM abd 4' 4'                  THERAPEUTIC EXERCISE  "HEP          UBE?           Seated cervical retraction 11/22          Seated cervical retraction ext           Seated thoracic ext over table           Seated R shld abd MWM w/ AP mob           Seated cervical rot SNAG w/ towel                      Sidelying open book 11/29 10ea 10ea  X10 ea X10 ea     Sidelying ER  2x10 2x10  X10 ea X10 ea     Sidelying abd   2x10  X10 ea X10 ea     Standing scaption iso           Wall slides shld flex?           Post capsule stretch cross body           Doorway pec stretch  2x20\"ea                                Bent over row  15# 2x10 15# 2x10  15#  10x2 ea 15#  10x2 ea     Bent over shld ext  nv 7.5# 2x10  7#  10x2 ea 7#  10x2 ea     Bent over horz abd  nv         CC lat pulldown   110# 2x10  110#  10x2 110#  10x2     CC row   88# 2x10  110#  10x2 110#  10x2     Barbell row                                 NEUROMUSCULAR REEDUCATION            Standing shld ER iso  5\"x10                               TB row           TB shld ext           TB ER           TB horz abd   MTB 2x10   MTB  x20                                                                                  THERAPEUTIC ACTIVITY                                                       GAIT TRAINING                                                       MODALITIES                                             "

## 2025-02-03 ENCOUNTER — TELEPHONE (OUTPATIENT)
Dept: GASTROENTEROLOGY | Facility: CLINIC | Age: 65
End: 2025-02-03

## 2025-02-03 NOTE — TELEPHONE ENCOUNTER
Left message for patient to answer OA and ASC questions. Patient will need to be scheduled 02/21/2025. Prior colonoscopy report scanned into chart.

## 2025-02-04 ENCOUNTER — OFFICE VISIT (OUTPATIENT)
Dept: PHYSICAL THERAPY | Facility: CLINIC | Age: 65
End: 2025-02-04
Payer: MEDICARE

## 2025-02-04 DIAGNOSIS — G89.29 CHRONIC RIGHT SHOULDER PAIN: Primary | ICD-10-CM

## 2025-02-04 DIAGNOSIS — M25.512 CHRONIC LEFT SHOULDER PAIN: ICD-10-CM

## 2025-02-04 DIAGNOSIS — M54.2 CERVICALGIA: ICD-10-CM

## 2025-02-04 DIAGNOSIS — G89.29 CHRONIC LEFT SHOULDER PAIN: ICD-10-CM

## 2025-02-04 DIAGNOSIS — M25.511 CHRONIC RIGHT SHOULDER PAIN: Primary | ICD-10-CM

## 2025-02-04 PROCEDURE — 97110 THERAPEUTIC EXERCISES: CPT

## 2025-02-04 PROCEDURE — 97140 MANUAL THERAPY 1/> REGIONS: CPT

## 2025-02-04 NOTE — PROGRESS NOTES
Daily Note     Today's date: 2025  Patient name: Catracho Singleton  : 1960  MRN: [id number]  Referring provider: WILLEM George  Dx:   Encounter Diagnosis     ICD-10-CM    1. Chronic right shoulder pain  M25.511     G89.29       2. Chronic left shoulder pain  M25.512     G89.29       3. Cervicalgia  M54.2                      Subjective: Pt reports R shoulder is feeling a little better but L shoulder has been a little more sore at times.     Objective: See treatment diary below      Assessment: Patient tolerated treatment well overall. Note greater limitation in L shoulder PROM compared to R shoulder. Both improve well with manual therapy and mobilizations. Continued scapular strengthening today. Will benefit from skilled PT to address impairments and return to PLOF      Plan: Continue progressing bilateral shoulder ROM and strength     Daily Treatment Diary     Precautions: none  Functional Limitations: sleeping - unable to sleep more than one hour to one and a half hours before being woken up by pain; unable to bench press/lift like he used to  Impairments: cervical extension ROM, bilateral cervical rotation ROM, shoulder flexion and abduction AROM  [facility] Code: [email or web address]   POC expiration: 3/20/2025  FOTO intake: 54  FOTO status:   FOTO predicted by visit 10: 70      POC Expires Reeval for Medicare to be completed  Unit Limit Auth Expiration Date PT/OT/STVisit Limit   3/20/2025 By visit 15 N/a BOMN BOMN    Completed on visit 5                   Auth Status DATE 11/29 12/3 1/23 1/28 1/30 2/4    NA Visit # 3 4 5 6 7 8     Remaining          MANUAL THERAPY          Supine cervical PA mobs grade 1/2 Gr 2/3 5'         Supine SOR 2'         Supine cervical distraction 2'                   Prone thoracic mobs grade 2/3          Supine R shoulder PROM  4' L/R 12' B/l 20' B/l 20' B/L 15'    Supine R GHJ AP mob grade 3/4 4' and inf glide 5' and inf glide L/R 8'   5'    Supine R GHJ AP MWM abd 4' 4'       "            THERAPEUTIC EXERCISE HEP          UBE?           Seated cervical retraction 11/22          Seated cervical retraction ext           Seated thoracic ext over table           Seated R shld abd MWM w/ AP mob           Seated cervical rot SNAG w/ towel                      Sidelying open book 11/29 10ea 10ea  X10 ea X10 ea 10ea    Sidelying ER  2x10 2x10  X10 ea X10 ea 10ea    Sidelying abd   2x10  X10 ea X10 ea 10ea    Standing scaption iso           Wall slides shld flex?           Post capsule stretch cross body           Doorway pec stretch  2x20\"ea                                Bent over row  15# 2x10 15# 2x10  15#  10x2 ea 15#  10x2 ea 15# 2x10 ea    Bent over shld ext  nv 7.5# 2x10  7#  10x2 ea 7#  10x2 ea 7# 2x10ea    Bent over horz abd  nv         CC lat pulldown   110# 2x10  110#  10x2 110#  10x2 110# 2x10    CC row   88# 2x10  110#  10x2 110#  10x2 110# 2x10    Barbell row                                 NEUROMUSCULAR REEDUCATION            Standing shld ER iso  5\"x10                               TB row           TB shld ext           TB ER           TB horz abd   MTB 2x10   MTB  x20 MTB 20x                                                                                 THERAPEUTIC ACTIVITY                                                       GAIT TRAINING                                                       MODALITIES                                             "

## 2025-02-06 ENCOUNTER — APPOINTMENT (OUTPATIENT)
Dept: PHYSICAL THERAPY | Facility: CLINIC | Age: 65
End: 2025-02-06
Payer: MEDICARE

## 2025-02-07 ENCOUNTER — TELEPHONE (OUTPATIENT)
Age: 65
End: 2025-02-07

## 2025-02-07 NOTE — TELEPHONE ENCOUNTER
Left message for patient to answer OA and ASC questions. Patient will need to be scheduled 02/21/2025.

## 2025-02-07 NOTE — TELEPHONE ENCOUNTER
02/07/25  Screened by: Alida Paredes    Referring Provider 10yr Recall    Pre- Screening:     There is no height or weight on file to calculate BMI.  BMI - 32.0  Has patient been referred for a routine screening Colonoscopy? yes  Is the patient between 45-75 years old? yes  WEIGHT - 270  HEIGHT - 6'5      Previous Colonoscopy yes   If yes:    Date: 7/14/2014    Facility: [facility]    Reason: SCREENING        Does the patient want to see a Gastroenterologist prior to their procedure OR are they having any GI symptoms? no    Has the patient been hospitalized or had abdominal surgery in the past 6 months? no    Does the patient use supplemental oxygen? no    Does the patient take Coumadin, Lovenox, Plavix, Elliquis, Xarelto, or other blood thinning medication? Yes - on Lisinopril    Has the patient had a stroke, cardiac event, or stent placed in the past year? no        If patient is between 45yrs - 49yrs, please advise patient that we will have to confirm benefits & coverage with their insurance company for a routine screening colonoscopy.

## 2025-02-10 NOTE — TELEPHONE ENCOUNTER
Left message for patient to call back to confirm that he is not on a blood thinner--Lisinopril is not a blood thinner.

## 2025-02-11 ENCOUNTER — OFFICE VISIT (OUTPATIENT)
Dept: PHYSICAL THERAPY | Facility: CLINIC | Age: 65
End: 2025-02-11
Payer: MEDICARE

## 2025-02-11 DIAGNOSIS — G89.29 CHRONIC LEFT SHOULDER PAIN: ICD-10-CM

## 2025-02-11 DIAGNOSIS — M25.512 CHRONIC LEFT SHOULDER PAIN: ICD-10-CM

## 2025-02-11 DIAGNOSIS — G89.29 CHRONIC RIGHT SHOULDER PAIN: Primary | ICD-10-CM

## 2025-02-11 DIAGNOSIS — M25.511 CHRONIC RIGHT SHOULDER PAIN: Primary | ICD-10-CM

## 2025-02-11 PROCEDURE — 97110 THERAPEUTIC EXERCISES: CPT

## 2025-02-11 PROCEDURE — 97140 MANUAL THERAPY 1/> REGIONS: CPT

## 2025-02-11 NOTE — PROGRESS NOTES
Daily Note     Today's date: 2025  Patient name: Catracho Singleton  : 1960  MRN: [id number]  Referring provider: WILELM George  Dx:   Encounter Diagnosis     ICD-10-CM    1. Chronic right shoulder pain  M25.511     G89.29       2. Chronic left shoulder pain  M25.512     G89.29                      Subjective: Pt reports R shoulder is feeling a little better but L shoulder has been a little more sore at times.     Objective: See treatment diary below      Assessment:  Pt tolerated treatment well. Continued relief with sleeping was noted after  LV so same was replicated again today with some light strengthening after, which he tolerated well. Patient demonstrated fatigue post treatment, exhibited good technique with therapeutic exercises, and would benefit from continued PT . Will benefit from skilled PT to address impairments and return to PLOF      Plan: Continue progressing bilateral shoulder ROM and strength     Daily Treatment Diary     Precautions: none  Functional Limitations: sleeping - unable to sleep more than one hour to one and a half hours before being woken up by pain; unable to bench press/lift like he used to  Impairments: cervical extension ROM, bilateral cervical rotation ROM, shoulder flexion and abduction AROM  MedBridge Code: [email or web address]   POC expiration: 3/20/2025  FOTO intake: 54  FOTO status:   FOTO predicted by visit 10: 70      POC Expires Reeval for Medicare to be completed  Unit Limit Auth Expiration Date PT/OT/STVisit Limit   3/20/2025 By visit 15 N/a BOMN BOMN    Completed on visit 5                   Auth Status DATE 11/29 12/3 1/23 1/28 1/30 2/4 2/11   NA Visit # 3 4 5 6 7 8 9    Remaining          MANUAL THERAPY          Supine cervical PA mobs grade 1/2 Gr 2/3 5'         Supine SOR 2'         Supine cervical distraction 2'                   Prone thoracic mobs grade 2/3          Supine R shoulder PROM  4' L/R 12' B/l 20' B/l 20' B/L 15' B/l 20'   Supine R GHJ AP mob  "grade 3/4 4' and inf glide 5' and inf glide L/R 8'   5'    Supine R GHJ AP MWM abd 4' 4'                  THERAPEUTIC EXERCISE HEP          UBE?           Seated cervical retraction 11/22          Seated cervical retraction ext           Seated thoracic ext over table           Seated R shld abd MWM w/ AP mob           Seated cervical rot SNAG w/ towel                      Sidelying open book 11/29 10ea 10ea  X10 ea X10 ea 10ea X10 ea   Sidelying ER  2x10 2x10  X10 ea X10 ea 10ea X10 ea   Sidelying abd   2x10  X10 ea X10 ea 10ea X10 ea   Standing scaption iso           Wall slides shld flex?           Post capsule stretch cross body           Doorway pec stretch  2x20\"ea          AA Supine Flx OH with cane        x20              Bent over row  15# 2x10 15# 2x10  15#  10x2 ea 15#  10x2 ea 15# 2x10 ea 15# 2x10 ea   Bent over shld ext  nv 7.5# 2x10  7#  10x2 ea 7#  10x2 ea 7# 2x10ea 8# 2x10 ea   Bent over horz abd  nv         CC lat pulldown   110# 2x10  110#  10x2 110#  10x2 110# 2x10 110# 2x10   CC row   88# 2x10  110#  10x2 110#  10x2 110# 2x10 110# 2x10   Barbell row                                 NEUROMUSCULAR REEDUCATION            Standing shld ER iso  5\"x10                               TB row           TB shld ext           TB ER           TB horz abd   MTB 2x10   MTB  x20 MTB 20x MTB 20x                                                                                THERAPEUTIC ACTIVITY                                                       GAIT TRAINING                                                       MODALITIES                                             "

## 2025-02-12 NOTE — TELEPHONE ENCOUNTER
Left message for patient to call back and answer [facility] questions and schedule a colonoscopy. Patient was scheduled for an office visit because of taking Lisinopril, but it is not needed for this medication.

## 2025-02-13 ENCOUNTER — OFFICE VISIT (OUTPATIENT)
Dept: PHYSICAL THERAPY | Facility: CLINIC | Age: 65
End: 2025-02-13
Payer: MEDICARE

## 2025-02-13 ENCOUNTER — PREP FOR PROCEDURE (OUTPATIENT)
Age: 65
End: 2025-02-13

## 2025-02-13 DIAGNOSIS — Z12.11 SCREENING FOR COLON CANCER: Primary | ICD-10-CM

## 2025-02-13 DIAGNOSIS — G89.29 CHRONIC LEFT SHOULDER PAIN: ICD-10-CM

## 2025-02-13 DIAGNOSIS — G89.29 CHRONIC RIGHT SHOULDER PAIN: Primary | ICD-10-CM

## 2025-02-13 DIAGNOSIS — M25.511 CHRONIC RIGHT SHOULDER PAIN: Primary | ICD-10-CM

## 2025-02-13 DIAGNOSIS — M25.512 CHRONIC LEFT SHOULDER PAIN: ICD-10-CM

## 2025-02-13 PROCEDURE — 97110 THERAPEUTIC EXERCISES: CPT

## 2025-02-13 PROCEDURE — 97140 MANUAL THERAPY 1/> REGIONS: CPT

## 2025-02-13 NOTE — TELEPHONE ENCOUNTER
Pt returned our call to schedule his colonoscopy. Pt is not on any blood thinners. Please mail JEMIMA/DUL instructions as well as diabetic instructions to the pt.

## 2025-02-13 NOTE — TELEPHONE ENCOUNTER
Scheduled date of colonoscopy (as of today): 03/05/25  Physician performing colonoscopy: Jacquie  Location of colonoscopy: [facility]  Bowel prep reviewed with patient: JEMIMA/AGATA sent via mail  Instructions reviewed with patient by: SF  Clearances: N/A    : Ashkan Stoll

## 2025-02-13 NOTE — PROGRESS NOTES
Daily Note     Today's date: 2025  Patient name: Catracho Singleton  : 1960  MRN: [id number]  Referring provider: WILLEM George  Dx:   Encounter Diagnosis     ICD-10-CM    1. Chronic right shoulder pain  M25.511     G89.29       2. Chronic left shoulder pain  M25.512     G89.29                      Subjective: Pt reports shoulders continue to improve and he is sleeping better.     Objective: See treatment diary below      Assessment:  Pt tolerated treatment well. Pt continues to benefit from manual stretching with immediate relief post. Progressed strengthening TE's with no adverse effects . Will benefit from skilled PT to address impairments and return to PLOF      Plan: Continue progressing bilateral shoulder ROM and strength     Daily Treatment Diary     Precautions: none  Functional Limitations: sleeping - unable to sleep more than one hour to one and a half hours before being woken up by pain; unable to bench press/lift like he used to  Impairments: cervical extension ROM, bilateral cervical rotation ROM, shoulder flexion and abduction AROM  [facility] [email or web address]   POC expiration: 3/20/2025  FOTO intake: 54  FOTO status:   FOTO predicted by visit 10: 70      POC Expires Reeval for Medicare to be completed  Unit Limit Auth Expiration Date PT/OT/STVisit Limit   3/20/2025 By visit 15 N/a BOMN BOMN    Completed on visit 5                   Auth Status DATE  2   NA Visit # 6 7 8 9 10    Remaining        MANUAL THERAPY        Supine cervical PA mobs grade 1/2        Supine SOR        Supine cervical distraction                Prone thoracic mobs grade 2/3        Supine R shoulder PROM B/l 20' B/l 20' B/L 15' B/l 20' B/l 20'   Supine R GHJ AP mob grade 3/4   5'     Supine R GHJ AP MWM abd                THERAPEUTIC EXERCISE HEP        UBE?         Seated cervical retraction         Seated cervical retraction ext         Seated thoracic ext over table         Seated R  shld abd MWM w/ AP mob         Seated cervical rot SNAG w/ towel                  Sidelying open book 11/29 X10 ea X10 ea 10ea X10 ea X10 ea   Sidelying ER  X10 ea X10 ea 10ea X10 ea X10 ea   Sidelying abd  X10 ea X10 ea 10ea X10 ea X10 ea   Standing scaption iso         Wall slides shld flex?         Post capsule stretch cross body         Doorway pec stretch         AA Supine Flx OH with cane     x20 x20            Bent over row  15#  10x2 ea 15#  10x2 ea 15# 2x10 ea 15# 2x10 ea 15# 10x2 ea   Bent over shld ext  7#  10x2 ea 7#  10x2 ea 7# 2x10ea 8# 2x10 ea 8# 10x2 ea   Bent over horz abd         CC lat pulldown  110#  10x2 110#  10x2 110# 2x10 110# 2x10 110# 2x10   CC row  110#  10x2 110#  10x2 110# 2x10 110# 2x10 110# 2x10   Barbell row         OHP      5# 10x2 ea            NEUROMUSCULAR REEDUCATION          Standing shld ER iso                           TB row         TB shld ext         TB ER         TB horz abd   MTB  x20 MTB 20x MTB 20x MTB x20                                                                  THERAPEUTIC ACTIVITY                                             GAIT TRAINING                                             MODALITIES

## 2025-02-13 NOTE — TELEPHONE ENCOUNTER
Teresa DURAND    2/13/25 11:59 AM  Note      Scheduled date of colonoscopy (as of today): 03/05/25  Physician performing colonoscopy: Jacquie  Location of colonoscopy: [facility]  Bowel prep reviewed with patient: JEMIMA/AGATA sent via mail  Instructions reviewed with patient by: KIZZY  Clearances: N/A     : Ashkan Stoll

## 2025-02-18 ENCOUNTER — OFFICE VISIT (OUTPATIENT)
Dept: PHYSICAL THERAPY | Facility: CLINIC | Age: 65
End: 2025-02-18
Payer: MEDICARE

## 2025-02-18 DIAGNOSIS — G89.29 CHRONIC LEFT SHOULDER PAIN: ICD-10-CM

## 2025-02-18 DIAGNOSIS — G89.29 CHRONIC RIGHT SHOULDER PAIN: Primary | ICD-10-CM

## 2025-02-18 DIAGNOSIS — M25.512 CHRONIC LEFT SHOULDER PAIN: ICD-10-CM

## 2025-02-18 DIAGNOSIS — M54.2 CERVICALGIA: ICD-10-CM

## 2025-02-18 DIAGNOSIS — M25.511 CHRONIC RIGHT SHOULDER PAIN: Primary | ICD-10-CM

## 2025-02-18 PROCEDURE — 97110 THERAPEUTIC EXERCISES: CPT

## 2025-02-18 PROCEDURE — 97140 MANUAL THERAPY 1/> REGIONS: CPT

## 2025-02-18 NOTE — PROGRESS NOTES
Daily Note     Today's date: 2025  Patient name: Catracho Singleton  : 1960  MRN: [id number]  Referring provider: WILLEM George  Dx:   Encounter Diagnosis     ICD-10-CM    1. Chronic right shoulder pain  M25.511     G89.29       2. Chronic left shoulder pain  M25.512     G89.29       3. Cervicalgia  M54.2                      Subjective: Pt reports overall feeling about the same.    Objective: See treatment diary below      Assessment:  Pt tolerated treatment well overall. He does still have limited mobility primarily in bilateral abduction and flexion with mild pain. Continued working on scapular strengthening for shoulder AROM. He will benefit from skilled PT to address impairments and return to PLOF      Plan: Continue progressing bilateral shoulder ROM and strength     Daily Treatment Diary     Precautions: none  Functional Limitations: sleeping - unable to sleep more than one hour to one and a half hours before being woken up by pain; unable to bench press/lift like he used to  Impairments: cervical extension ROM, bilateral cervical rotation ROM, shoulder flexion and abduction AROM  MedBridge Code: [email or web address]   POC expiration: 3/20/2025  FOTO intake: 54  FOTO status:   FOTO predicted by visit 10: 70      POC Expires Reeval for Medicare to be completed  Unit Limit Auth Expiration Date PT/OT/STVisit Limit   3/20/2025 By visit 15 N/a BOMN BOMN    Completed on visit 5                   Auth Status DATE 2/4 2/11 2/13 2/15     NA Visit # 8 9 10 11      Remaining         MANUAL THERAPY         Supine cervical PA mobs grade 1/2         Supine SOR         Supine cervical distraction                  Prone thoracic mobs grade 2/3         Supine R shoulder PROM B/L 15' B/l 20' B/l 20' B/L 20'     Supine R GHJ AP mob grade 3/4 5'   4'     Supine R GHJ AP MWM abd                  THERAPEUTIC EXERCISE HEP         UBE?          Seated cervical retraction                    Sidelying open book  10ea  X10 ea X10 ea 10ea     Sidelying ER  10ea X10 ea X10 ea      Sidelying abd  10ea X10 ea X10 ea 10ea     Standing scaption iso          Wall slides shld flex?          Post capsule stretch cross body          Doorway pec stretch          AA Supine Flx OH with cane   x20 x20 nv               Bent over row  15# 2x10 ea 15# 2x10 ea 15# 10x2 ea 15# 2x10ea     Bent over shld ext  7# 2x10ea 8# 2x10 ea 8# 10x2 ea 8# 2x10ea     Bent over horz abd          CC lat pulldown  110# 2x10 110# 2x10 110# 2x10 121# 2x10     CC row  110# 2x10 110# 2x10 110# 2x10 121# 2x10     Barbell row          OHP    5# 10x2 ea MTB 20x               NEUROMUSCULAR REEDUCATION           Standing shld ER iso                              TB row          TB shld ext          TB ER          TB horz abd  MTB 20x MTB 20x MTB x20 MTB 20x                                                                           THERAPEUTIC ACTIVITY                                                  GAIT TRAINING                                                  MODALITIES

## 2025-02-19 ENCOUNTER — ANESTHESIA (OUTPATIENT)
Dept: ANESTHESIOLOGY | Facility: AMBULATORY SURGERY CENTER | Age: 65
End: 2025-02-19

## 2025-02-19 ENCOUNTER — ANESTHESIA EVENT (OUTPATIENT)
Dept: ANESTHESIOLOGY | Facility: AMBULATORY SURGERY CENTER | Age: 65
End: 2025-02-19

## 2025-02-20 ENCOUNTER — OFFICE VISIT (OUTPATIENT)
Dept: PHYSICAL THERAPY | Facility: CLINIC | Age: 65
End: 2025-02-20
Payer: MEDICARE

## 2025-02-20 DIAGNOSIS — M25.511 CHRONIC RIGHT SHOULDER PAIN: Primary | ICD-10-CM

## 2025-02-20 DIAGNOSIS — G89.29 CHRONIC LEFT SHOULDER PAIN: ICD-10-CM

## 2025-02-20 DIAGNOSIS — M25.512 CHRONIC LEFT SHOULDER PAIN: ICD-10-CM

## 2025-02-20 DIAGNOSIS — G89.29 CHRONIC RIGHT SHOULDER PAIN: Primary | ICD-10-CM

## 2025-02-20 PROCEDURE — 97110 THERAPEUTIC EXERCISES: CPT

## 2025-02-20 PROCEDURE — 97140 MANUAL THERAPY 1/> REGIONS: CPT

## 2025-02-20 NOTE — PROGRESS NOTES
Daily Note     Today's date: 2025  Patient name: Catracho Singleton  : 1960  MRN: [id number]  Referring provider: WILLEM George  Dx:   Encounter Diagnosis     ICD-10-CM    1. Chronic right shoulder pain  M25.511     G89.29       2. Chronic left shoulder pain  M25.512     G89.29                      Subjective: Pt reports he's making some overall progress.     Objective: See treatment diary below      Assessment:  Pt tolerated treatment well overall. Continued to work on improving his overall  b/l shoulder mobility which has some limitations, mostly with ABD and IR/ER.  He will benefit from skilled PT to address impairments and return to PLOF      Plan: Continue progressing bilateral shoulder ROM and strength     Daily Treatment Diary     Precautions: none  Functional Limitations: sleeping - unable to sleep more than one hour to one and a half hours before being woken up by pain; unable to bench press/lift like he used to  Impairments: cervical extension ROM, bilateral cervical rotation ROM, shoulder flexion and abduction AROM  MedBridge Code: [email or web address]   POC expiration: 3/20/2025  FOTO intake: 54  FOTO status:   FOTO predicted by visit 10: 70      POC Expires Reeval for Medicare to be completed  Unit Limit Auth Expiration Date PT/OT/STVisit Limit   3/20/2025 By visit 15 N/a BOMN BOMN    Completed on visit 5                   Auth Status DATE 2/4 2/11 2/13 2/15 2/20    NA Visit # 8 9 10 11 12     Remaining         MANUAL THERAPY         Supine cervical PA mobs grade 1/2         Supine SOR         Supine cervical distraction                  Prone thoracic mobs grade 2/3         Supine R shoulder PROM B/L 15' B/l 20' B/l 20' B/L 20' B/l  20'    Supine R GHJ AP mob grade 3/4 5'   4'     Supine R GHJ AP MWM abd                  THERAPEUTIC EXERCISE HEP         UBE?          Seated cervical retraction                    Sidelying open book  10ea X10 ea X10 ea 10ea X10 ea    Sidelying ER  10ea  X10 ea X10 ea      Sidelying abd  10ea X10 ea X10 ea 10ea X10 ea    Standing scaption iso          Wall slides shld flex?          Post capsule stretch cross body          Doorway pec stretch          AA Supine Flx OH with cane   x20 x20 nv X20              Bent over row  15# 2x10 ea 15# 2x10 ea 15# 10x2 ea 15# 2x10ea 15# 2x10ea    Bent over shld ext  7# 2x10ea 8# 2x10 ea 8# 10x2 ea 8# 2x10ea 8# 2x10ea    Bent over horz abd          CC lat pulldown  110# 2x10 110# 2x10 110# 2x10 121# 2x10 121# 2x10    CC row  110# 2x10 110# 2x10 110# 2x10 121# 2x10 121# 2x10    Barbell row          OHP    5# 10x2 ea MTB 20x 5#  10x2              NEUROMUSCULAR REEDUCATION           Standing shld ER iso                              TB row          TB shld ext          TB ER          TB horz abd  MTB 20x MTB 20x MTB x20 MTB 20x MTB  10x2                                                                          THERAPEUTIC ACTIVITY                                                  GAIT TRAINING                                                  MODALITIES

## 2025-02-24 ENCOUNTER — TELEPHONE (OUTPATIENT)
Dept: GASTROENTEROLOGY | Facility: CLINIC | Age: 65
End: 2025-02-24

## 2025-02-25 ENCOUNTER — OFFICE VISIT (OUTPATIENT)
Dept: PHYSICAL THERAPY | Facility: CLINIC | Age: 65
End: 2025-02-25
Payer: MEDICARE

## 2025-02-25 DIAGNOSIS — G89.29 CHRONIC RIGHT SHOULDER PAIN: Primary | ICD-10-CM

## 2025-02-25 DIAGNOSIS — M25.511 CHRONIC RIGHT SHOULDER PAIN: Primary | ICD-10-CM

## 2025-02-25 DIAGNOSIS — M25.512 CHRONIC LEFT SHOULDER PAIN: ICD-10-CM

## 2025-02-25 DIAGNOSIS — G89.29 CHRONIC LEFT SHOULDER PAIN: ICD-10-CM

## 2025-02-25 PROCEDURE — 97110 THERAPEUTIC EXERCISES: CPT

## 2025-02-25 PROCEDURE — 97140 MANUAL THERAPY 1/> REGIONS: CPT

## 2025-02-25 NOTE — PROGRESS NOTES
Daily Note     Today's date: 2025  Patient name: Catracho Singleton  : 1960  MRN: [id number]  Referring provider: WILLEM George  Dx:   Encounter Diagnosis     ICD-10-CM    1. Chronic right shoulder pain  M25.511     G89.29       2. Chronic left shoulder pain  M25.512     G89.29                      Subjective: Pt reports he continues to see improvements in his sleep as a result of his shoulders feeling better    Objective: See treatment diary below      Assessment:  Pt tolerated treatment well overall. Continued to work on improving his overall  b/l shoulder mobility which has some limitations, mostly with ABD and IR/ER.  He will benefit from skilled PT to address impairments and return to PLOF      Plan: Continue progressing bilateral shoulder ROM and strength     Daily Treatment Diary     Precautions: none  Functional Limitations: sleeping - unable to sleep more than one hour to one and a half hours before being woken up by pain; unable to bench press/lift like he used to  Impairments: cervical extension ROM, bilateral cervical rotation ROM, shoulder flexion and abduction AROM  MedBridge Code: [email or web address]   POC expiration: 3/20/2025  FOTO intake: 54  FOTO status:   FOTO predicted by visit 10: 70      POC Expires Reeval for Medicare to be completed  Unit Limit Auth Expiration Date PT/OT/STVisit Limit   3/20/2025 By visit 15 N/a BOMN BOMN    Completed on visit 5                   Auth Status DATE 2/4 2/11 2/13 2/15 2/20 2/25   NA Visit # 8 9 10 11 12 13    Remaining         MANUAL THERAPY         Supine cervical PA mobs grade 1/2         Supine SOR         Supine cervical distraction                  Prone thoracic mobs grade 2/3         Supine R shoulder PROM B/L 15' B/l 20' B/l 20' B/L 20' B/l  20' B/l  20'   Supine R GHJ AP mob grade 3/4 5'   4'     Supine R GHJ AP MWM abd                  THERAPEUTIC EXERCISE HEP         UBE?          Seated cervical retraction                    Sidelying  open book 11/29 10ea X10 ea X10 ea 10ea X10 ea X10 ea   Sidelying ER  10ea X10 ea X10 ea      Sidelying abd  10ea X10 ea X10 ea 10ea X10 ea X10 ea   Standing scaption iso          Wall slides shld flex?          Post capsule stretch cross body          Doorway pec stretch          AA Supine Flx OH with cane   x20 x20 nv X20 x20             Bent over row  15# 2x10 ea 15# 2x10 ea 15# 10x2 ea 15# 2x10ea 15# 2x10ea 15# 2x10ea   Bent over shld ext  7# 2x10ea 8# 2x10 ea 8# 10x2 ea 8# 2x10ea 8# 2x10ea 8# 2x10ea   Bent over horz abd          CC lat pulldown  110# 2x10 110# 2x10 110# 2x10 121# 2x10 121# 2x10 121# 2x10   CC row  110# 2x10 110# 2x10 110# 2x10 121# 2x10 121# 2x10 121# 2x10   Barbell row          OHP    5# 10x2 ea MTB 20x 5#  10x2 5# 2x10             NEUROMUSCULAR REEDUCATION           Standing shld ER iso                              TB row          TB shld ext          TB ER          TB horz abd  MTB 20x MTB 20x MTB x20 MTB 20x MTB  10x2    TB Horz ABD with op arm press out       MTB  10x2                                                               THERAPEUTIC ACTIVITY                                                  GAIT TRAINING                                                  MODALITIES

## 2025-02-27 ENCOUNTER — EVALUATION (OUTPATIENT)
Dept: PHYSICAL THERAPY | Facility: CLINIC | Age: 65
End: 2025-02-27
Payer: MEDICARE

## 2025-02-27 DIAGNOSIS — G89.29 CHRONIC LEFT SHOULDER PAIN: ICD-10-CM

## 2025-02-27 DIAGNOSIS — M25.512 CHRONIC LEFT SHOULDER PAIN: ICD-10-CM

## 2025-02-27 DIAGNOSIS — G89.29 CHRONIC RIGHT SHOULDER PAIN: Primary | ICD-10-CM

## 2025-02-27 DIAGNOSIS — M25.511 CHRONIC RIGHT SHOULDER PAIN: Primary | ICD-10-CM

## 2025-02-27 DIAGNOSIS — M54.2 CERVICALGIA: ICD-10-CM

## 2025-02-27 PROCEDURE — 97140 MANUAL THERAPY 1/> REGIONS: CPT

## 2025-02-27 PROCEDURE — 97110 THERAPEUTIC EXERCISES: CPT

## 2025-02-27 NOTE — PROGRESS NOTES
PT Re-Evaluation     Today's date: 2025  Patient name: Catracho Singleton  : 1960  MRN: [id number]  Referring provider: WILLEM George  Dx:   Encounter Diagnosis     ICD-10-CM    1. Chronic right shoulder pain  M25.511     G89.29       2. Chronic left shoulder pain  M25.512     G89.29       3. Cervicalgia  M54.2                        Assessment  Impairments: abnormal or restricted ROM, activity intolerance, impaired physical strength, lacks appropriate home exercise program and pain with function    Assessment details: Catracho Singleton is a pleasant 64 y.o. male who presents with chronic R sided headaches, chronic bilateral shoulder pain, and reports of decreased mobility of his neck beginning about 6 months ago of insidious onset. He presents with decreased cervical extension and bilateral rotation ROM and decreased shoulder flexion and abduction ROM with pain. He had improved cervical extension and rotation ROM following 10 repetitions of cervical retraction in sitting. No other tests were done secondary to time, but will assess BUE strength and ROM, nerve tension, and cervical joint play next visit. These impairments are limiting him with sleeping more than 1-1.5 hours uninterrupted and turning his head while driving. He has a primary movement diagnosis of cervical hypomobility. These signs and symptoms are consistent with cervicalgia, bilateral shoulder pain, and cervicogenic headaches. He will benefit from skilled PT to address impairments and return to PLOF     2024: Patient had initial PT evaluation on 2024 and today's re-evaluation is a continuation of the initial evaluation. He reports feeling improved mobility in his neck but notes no change in his shoulder pain. Evaluated bilateral shoulder ROM and strength today. Note normal flexion ROM bilaterally. He has mildly limited and painfree L shoulder ROM. Note painful and limited R shoulder abduction ROM that improved by 30  degrees after AP mobilizations and MWM during abduction. He has weakness in shoulder abduction and ER bilaterally though a greater limitation in R shoulder with reproduction of pain. Cervical ROM improved after manual therapy and repeated cervical extension and thoracic extension. These impairments continue to limit him with sleeping and driving. He will benefit from skilled PT to address impairments and return to PLOF    1/23/2025: Patient is resuming PT after a break in treatment due to work schedule. Re-assessed cervical spine and bilateral shoulders. He presents with decreased cervical ROM and decreased bilateral shoulder ROM and strength. Though he had a lapse in treatment, his ROM and strength is not any worse than it was upon IE. His impairments are limiting him with sleeping, turning his head while driving, and doing heavier lifting. He will benefit from skilled PT to address impairments and return to PLOF. He was reminded about the referral that I placed for him to see Dr. Casey for a consult for his shoulders.      2/27/2025: Patient has made some progress with attending skilled PT consistently 2x/week since 1/23/25. No change in cervical ROM but this was not the focus of treatment. Note some improvements in bilateral shoulder ROM and improvements in strength. He does demonstrate decreased abduction and ER strength with mild pain. These impairments and symptoms are limiting him primarily with sleeping. He will benefit from skilled PT to address strength and ROM as well as consulting with the Orthopedic Surgeon.   Prognosis details: Positive prognostic indicators include positive attitude toward recovery, motivated to improve, high self-efficacy, good understanding of condition, realistic expectations.  Negative prognostic indicators include chronicity of symptoms, high symptom irritability    Goals  STG to be achieved in 4 weeks from 1/23  Patient will have improved cervical extension ROM to 40  degrees.(Ongoing)  Improved bilateral cervical rotation ROM to 75 degrees(ongoing)  Evaluate shoulder mobility and strength (met)  Patient will be independent with HEP.    LTG to be achieved in 8 weeks (all ongoing) - due to break in PT, continue all goals from 1/23  Patient will be able to turn his head better when driving (met)  Patient will be able to sleep at least 3 hours at night time before pain wakes him up(some progress)  Patient will be able to do physical labor with his upper body with less pain (good progress)      Plan  Patient would benefit from: skilled physical therapy  Planned modality interventions: cryotherapy, thermotherapy: hydrocollator packs, TENS and low level laser therapy    Planned therapy interventions: home exercise program, functional ROM exercises, body mechanics training, joint mobilization, manual therapy, neuromuscular re-education, therapeutic exercise, therapeutic activities, stretching, strengthening, flexibility and patient/caregiver education    Frequency: 1-2x week  Duration in weeks: 8  Plan of Care beginning date: 2/27/2025  Plan of Care expiration date: 4/24/2025  Treatment plan discussed with: patient and PTA        Subjective Evaluation    History of Present Illness  Date of onset: 5/22/2024  Mechanism of injury: Patient reports insidious onset of bilateral shoulder pain about 6 months ago. He denies much neck pain but does note trouble turning his head to look both ways while driving. He drives a lot for work. 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 No issues doing his job but notes not being able to lift like bench pressing, etc like he used to. Most of his pain is at night time. He is unable to sleep more than 1-1.5 hours before pain wakes him up. He notes he will sometimes have a headache that is right sided. He mainly  wants to improve his pain so he can get better rest because it impacts his energy throughout the day    Denies numbness/tingling, chest pain, nausea/vomiting, recent infection, recent fever, changes in vision, difficulty swallowing    11/4/2024 L shoulder x-ray report: Tiny osteophyte of the inferior glenoid margin with preserved glenohumeral joint space.  No fracture or dislocation.  Subtle tiny calcific density adjacent to the humeral head, which may indicate calcific tendinitis    11/4/2024: R shoulder x-ray report: Mild degenerative change of the glenohumeral joint.  Very mild acromioclavicular arthrosis.  No fracture or dislocation.    11/4/2024 cervical spine x-ray: No acute fracture or subluxation.   Reversal normal cervical lordosis with trace anterolisthesis C4-5 and retrolisthesis C5-6 and C6-7.  Advanced disc height loss C5-6 and C6-7 with vertebral body endplate spurring and multilevel facet and uncovertebral hypertrophy.    11/26/2024: Patient reports he has been doing the neck exercise at least 3 times a day since his first PT appointment on 11/22/2024. Notes one night he got 3 hours of sleep before he woke up - notes he wasn't woken up by pain though. He notes the other nights were bad nights of sleep though. Feels the neck exercise does help and he is able to turn his head better    1/23/2025: Patient notes he has been doing the exercises most days. He notes still having most shoulder pain when trying to sleep at night time. During the day he is able to do everything. Has not yet scheduled a consult with Dr. Caesy as he forgot. Held on PT for a few weeks due to not knowing his work schedule in advance    2/27/2025: Patient notes some days things are feeling better but others are bad. Earlier this week he felt pretty good but did have trouble sleeping last night. Most pain is at night that prevents him from sleeping still. He has an appointment with an orthopedic surgeon in a few weeks for a  consult  Patient Goals  Patient goal: Patient wants to relieve his pain so he can get better sleep and also wants to be able to turn his head better for driving  Pain  At best pain rating: 3  Relieving factors: change in position    Treatments  No previous or current treatments        Objective     Tenderness     Additional Tenderness Details  TTP to post scapula on R and anterior shoulder L     Active Range of Motion   Cervical/Thoracic Spine       Cervical    Flexion: 50 degrees   Extension: 35 degrees      Left rotation: 65 degrees with pain  Right rotation: 60 degrees    with pain  Left Shoulder   Flexion: 170 degrees   Abduction: 155 degrees   External rotation 90°: 78 degrees     Right Shoulder   Flexion: 170 degrees   Abduction: 150 degrees with pain  External rotation 90°: 70 degrees    Joint Play   Left Shoulder  Joints within functional limits are the posterior capsule.     Right Shoulder  Hypomobile in the posterior capsule.     Hypomobile: C2, C3, C4, C5, C6 and C7     Strength/Myotome Testing     Left Shoulder     Planes of Motion   Flexion: 5   Abduction: 4 (pain)   External rotation at 0°: 4+ (pain)   Internal rotation at 0°: 5     Right Shoulder     Planes of Motion   Flexion: 4   Abduction: 4 (pain)   External rotation at 0°: 4 (pain)   Internal rotation at 0°: 5             Precautions: none  Functional Limitations: sleeping - unable to sleep more than one hour to one and a half hours before being woken up by pain; unable to bench press/lift like he used to  Impairments: cervical extension ROM, bilateral cervical rotation ROM, shoulder flexion and abduction AROM  [employer] Code: [email or web address]   POC expiration: 4/24/2025  FOTO intake: 54  FOTO status:   FOTO predicted by visit 10: 70      POC Expires Reeval for Medicare to be completed  Unit Limit Auth Expiration Date PT/OT/STVisit Limit   4/24/2025 By visit 24 N/a BOMN BOMN    Completed on visit 14                   Auth Status DATE 2/4 2/11 2/13 2/15  2/20 2/25 2/27   NA Visit # 8 9 10 11 12 13 14    Remaining          MANUAL THERAPY          Supine cervical PA mobs grade 1/2          Supine SOR          Supine cervical distraction                    Prone thoracic mobs grade 2/3          Supine R shoulder PROM B/L 15' B/l 20' B/l 20' B/L 20' B/l  20' B/l  20' B/l 20'   Supine R GHJ AP mob grade 3/4 5'   4'      Supine R GHJ AP MWM abd                    THERAPEUTIC EXERCISE HEP          UBE?           Seated cervical retraction 11/22                     Sidelying open book 11/29 10ea X10 ea X10 ea 10ea X10 ea X10 ea nv   Sidelying ER  10ea X10 ea X10 ea       Sidelying abd  10ea X10 ea X10 ea 10ea X10 ea X10 ea    Standing scaption iso           Wall slides shld flex?           Post capsule stretch cross body           Doorway pec stretch           AA Supine Flx OH with cane   x20 x20 nv X20 x20               Bent over row  15# 2x10 ea 15# 2x10 ea 15# 10x2 ea 15# 2x10ea 15# 2x10ea 15# 2x10ea 15# 2x10ea   Bent over shld ext  7# 2x10ea 8# 2x10 ea 8# 10x2 ea 8# 2x10ea 8# 2x10ea 8# 2x10ea 8# 2x10ea   Bent over horz abd           CC lat pulldown  110# 2x10 110# 2x10 110# 2x10 121# 2x10 121# 2x10 121# 2x10 132# 2x10   CC row  110# 2x10 110# 2x10 110# 2x10 121# 2x10 121# 2x10 121# 2x10 132# 2x10   Barbell row           OHP    5# 10x2 ea MTB 20x 5#  10x2 5# 2x10 5# 2x10              NEUROMUSCULAR REEDUCATION            Standing shld ER iso                                 TB row           TB shld ext           TB ER           TB horz abd  MTB 20x MTB 20x MTB x20 MTB 20x MTB  10x2     TB Horz ABD with op arm press out       MTB  10x2 MTB 2x10                                                                      THERAPEUTIC ACTIVITY                                                       GAIT TRAINING                                                       MODALITIES

## 2025-03-04 ENCOUNTER — OFFICE VISIT (OUTPATIENT)
Dept: PHYSICAL THERAPY | Facility: CLINIC | Age: 65
End: 2025-03-04
Payer: MEDICARE

## 2025-03-04 DIAGNOSIS — M25.511 CHRONIC RIGHT SHOULDER PAIN: Primary | ICD-10-CM

## 2025-03-04 DIAGNOSIS — G89.29 CHRONIC RIGHT SHOULDER PAIN: Primary | ICD-10-CM

## 2025-03-04 PROCEDURE — 97112 NEUROMUSCULAR REEDUCATION: CPT

## 2025-03-04 PROCEDURE — 97140 MANUAL THERAPY 1/> REGIONS: CPT

## 2025-03-04 PROCEDURE — 97110 THERAPEUTIC EXERCISES: CPT

## 2025-03-04 NOTE — PROGRESS NOTES
Daily Note     Today's date: 3/4/2025  Patient name: Catracho Singleton  : 1960  MRN: [id number]  Referring provider: WILLEM George  Dx:   Encounter Diagnosis     ICD-10-CM    1. Chronic right shoulder pain  M25.511     G89.29                      Subjective: Pt reports he still has good days and bad days, still wakes him up at night as well. Can get about 3 hours of sleep at a time.       Objective: See treatment diary below      Assessment: Tolerated treatment well. Has continued to show improved PROM and mobility, as well and increasing his strength with resistance exercises here in therapy. But is still getting the aching pains at night if he lays on that side. Patient demonstrated fatigue post treatment, exhibited good technique with therapeutic exercises, and would benefit from continued PT      Plan: Continue per plan of care.  Progress treatment as tolerated.       Precautions: none  Functional Limitations: sleeping - unable to sleep more than one hour to one and a half hours before being woken up by pain; unable to bench press/lift like he used to  Impairments: cervical extension ROM, bilateral cervical rotation ROM, shoulder flexion and abduction AROM  [facility] Code: [email or web address]   POC expiration: 2025  FOTO intake: 54  FOTO status:   FOTO predicted by visit 10: 70      POC Expires Reeval for Medicare to be completed  Unit Limit Auth Expiration Date PT/OT/STVisit Limit   2025 By visit 24 N/a BOMN BOMN    Completed on visit 14                   Auth Status DATE 2/4 2/11 2/13 2/15 2/20 2/25 2/27 3/   NA Visit # 8 9 10 11 12 13 14 15    Remaining           MANUAL THERAPY           Supine cervical PA mobs grade 1/2           Supine SOR           Supine cervical distraction                      Prone thoracic mobs grade 2/3           Supine R shoulder PROM B/L 15' B/l 20' B/l 20' B/L 20' B/l  20' B/l  20' B/l 20' B/l 20'   Supine R GHJ AP mob grade 3/4 5'   4'       Supine R GHJ AP MWM abd                       THERAPEUTIC EXERCISE HEP           UBE?            Seated cervical retraction 11/22                       Sidelying open book 11/29 10ea X10 ea X10 ea 10ea X10 ea X10 ea nv X10 ea   Sidelying ER  10ea X10 ea X10 ea     X10 ea   Sidelying abd  10ea X10 ea X10 ea 10ea X10 ea X10 ea  X10 ea   Standing scaption iso            Wall slides shld flex?            Post capsule stretch cross body            Doorway pec stretch            AA Supine Flx OH with cane   x20 x20 nv X20 x20  KB OH  7.5#  10x2               Bent over row  15# 2x10 ea 15# 2x10 ea 15# 10x2 ea 15# 2x10ea 15# 2x10ea 15# 2x10ea 15# 2x10ea    Bent over shld ext  7# 2x10ea 8# 2x10 ea 8# 10x2 ea 8# 2x10ea 8# 2x10ea 8# 2x10ea 8# 2x10ea    Bent over horz abd            CC lat pulldown  110# 2x10 110# 2x10 110# 2x10 121# 2x10 121# 2x10 121# 2x10 132# 2x10 132# 2x10   CC row  110# 2x10 110# 2x10 110# 2x10 121# 2x10 121# 2x10 121# 2x10 132# 2x10 132# 2x10   Barbell row            OHP    5# 10x2 ea MTB 20x 5#  10x2 5# 2x10 5# 2x10                NEUROMUSCULAR REEDUCATION             Standing shld ER iso                                    TB row            TB shld ext            TB ER         B/l ER  BTB  10x2   TB horz abd  MTB 20x MTB 20x MTB x20 MTB 20x MTB  10x2      TB Horz ABD with op arm press out       MTB  10x2 MTB 2x10  MTB  10x2                                                                           THERAPEUTIC ACTIVITY                                                            GAIT TRAINING                                                            MODALITIES

## 2025-03-05 ENCOUNTER — ANESTHESIA EVENT (OUTPATIENT)
Dept: GASTROENTEROLOGY | Facility: AMBULATORY SURGERY CENTER | Age: 65
End: 2025-03-05

## 2025-03-05 ENCOUNTER — ANESTHESIA (OUTPATIENT)
Dept: GASTROENTEROLOGY | Facility: AMBULATORY SURGERY CENTER | Age: 65
End: 2025-03-05

## 2025-03-05 ENCOUNTER — HOSPITAL ENCOUNTER (OUTPATIENT)
Dept: GASTROENTEROLOGY | Facility: AMBULATORY SURGERY CENTER | Age: 65
Discharge: HOME | End: 2025-03-05
Payer: COMMERCIAL

## 2025-03-05 VITALS
TEMPERATURE: 98 F | HEIGHT: 77 IN | WEIGHT: 270 LBS | HEART RATE: 94 BPM | RESPIRATION RATE: 16 BRPM | OXYGEN SATURATION: 95 % | DIASTOLIC BLOOD PRESSURE: 101 MMHG | SYSTOLIC BLOOD PRESSURE: 134 MMHG | BODY MASS INDEX: 31.88 KG/M2

## 2025-03-05 DIAGNOSIS — Z12.11 SCREENING FOR COLON CANCER: ICD-10-CM

## 2025-03-05 PROBLEM — I10 HTN (HYPERTENSION): Status: ACTIVE | Noted: 2025-03-05

## 2025-03-05 PROCEDURE — 45385 COLONOSCOPY W/LESION REMOVAL: CPT | Performed by: INTERNAL MEDICINE

## 2025-03-05 PROCEDURE — 88305 TISSUE EXAM BY PATHOLOGIST: CPT | Performed by: PATHOLOGY

## 2025-03-05 PROCEDURE — 45380 COLONOSCOPY AND BIOPSY: CPT | Performed by: INTERNAL MEDICINE

## 2025-03-05 RX ORDER — PROPOFOL 10 MG/ML
INJECTION, EMULSION INTRAVENOUS AS NEEDED
Status: DISCONTINUED | OUTPATIENT
Start: 2025-03-05 | End: 2025-03-05

## 2025-03-05 RX ORDER — LIDOCAINE HYDROCHLORIDE 10 MG/ML
INJECTION, SOLUTION EPIDURAL; INFILTRATION; INTRACAUDAL; PERINEURAL AS NEEDED
Status: DISCONTINUED | OUTPATIENT
Start: 2025-03-05 | End: 2025-03-05

## 2025-03-05 RX ORDER — SODIUM CHLORIDE, SODIUM LACTATE, POTASSIUM CHLORIDE, CALCIUM CHLORIDE 600; 310; 30; 20 MG/100ML; MG/100ML; MG/100ML; MG/100ML
50 INJECTION, SOLUTION INTRAVENOUS CONTINUOUS
Status: DISCONTINUED | OUTPATIENT
Start: 2025-03-05 | End: 2025-03-09 | Stop reason: HOSPADM

## 2025-03-05 RX ORDER — SODIUM CHLORIDE, SODIUM LACTATE, POTASSIUM CHLORIDE, CALCIUM CHLORIDE 600; 310; 30; 20 MG/100ML; MG/100ML; MG/100ML; MG/100ML
INJECTION, SOLUTION INTRAVENOUS CONTINUOUS PRN
Status: DISCONTINUED | OUTPATIENT
Start: 2025-03-05 | End: 2025-03-05

## 2025-03-05 RX ADMIN — PROPOFOL 40 MG: 10 INJECTION, EMULSION INTRAVENOUS at 08:33

## 2025-03-05 RX ADMIN — LIDOCAINE HYDROCHLORIDE 25 MG: 10 INJECTION, SOLUTION EPIDURAL; INFILTRATION; INTRACAUDAL; PERINEURAL at 08:23

## 2025-03-05 RX ADMIN — PROPOFOL 40 MG: 10 INJECTION, EMULSION INTRAVENOUS at 08:36

## 2025-03-05 RX ADMIN — PROPOFOL 30 MG: 10 INJECTION, EMULSION INTRAVENOUS at 08:30

## 2025-03-05 RX ADMIN — PROPOFOL 50 MG: 10 INJECTION, EMULSION INTRAVENOUS at 08:26

## 2025-03-05 RX ADMIN — SODIUM CHLORIDE, SODIUM LACTATE, POTASSIUM CHLORIDE, CALCIUM CHLORIDE 50 ML/HR: 600; 310; 30; 20 INJECTION, SOLUTION INTRAVENOUS at 08:18

## 2025-03-05 RX ADMIN — SODIUM CHLORIDE, SODIUM LACTATE, POTASSIUM CHLORIDE, CALCIUM CHLORIDE: 600; 310; 30; 20 INJECTION, SOLUTION INTRAVENOUS at 08:23

## 2025-03-05 RX ADMIN — PROPOFOL 120 MG: 10 INJECTION, EMULSION INTRAVENOUS at 08:23

## 2025-03-05 NOTE — H&P
"History and Physical -  [facility]  Catracho Singleton 64 y.o. male MRN: [id number]    HPI: Catracho Singleton is a 64 y.o. year old male who presents for colon cancer screening    REVIEW OF SYSTEMS: Per the HPI, and otherwise unremarkable.    Historical Information   Past Medical History:   Diagnosis Date    Hypertension      Past Surgical History:   Procedure Laterality Date    COLONOSCOPY       Social History   Social History     Substance and Sexual Activity   Alcohol Use Yes    Comment: occ     Social History     Substance and Sexual Activity   Drug Use Never     Social History     Tobacco Use   Smoking Status Former    Types: Cigarettes   Smokeless Tobacco Never     History reviewed. No pertinent family history.    Meds/Allergies       Current Outpatient Medications:     lisinopril (ZESTRIL) 20 mg tablet    Current Facility-Administered Medications:     lactated ringers infusion, 50 mL/hr, Intravenous, Continuous    No Known Allergies    Objective     /90   Pulse 98   Temp 98 °F (36.7 °C)   Resp 18   Ht 6' 5\" (1.956 m)   Wt 122 kg (270 lb)   SpO2 98%   BMI 32.02 kg/m²     PHYSICAL EXAM    Gen: NAD AAOx3  Head: Normocephalic, Atraumatic  CV: S1S2 RRR no m/r/g  CHEST: Clear b/l no c/r/w  ABD: soft, +BS NT/ND  EXT: no edema    ASSESSMENT/PLAN:  This is a 64 y.o. year old male here for colonoscopy, and he is stable and optimized for his procedure.        "

## 2025-03-05 NOTE — ANESTHESIA PREPROCEDURE EVALUATION
Procedure:  COLONOSCOPY    Relevant Problems   CARDIO   (+) HTN (hypertension)        Physical Exam    Airway    Mallampati score: II  TM Distance: >3 FB  Neck ROM: full     Dental   No notable dental hx     Cardiovascular  Cardiovascular exam normal    Pulmonary  Pulmonary exam normal     Other Findings        Anesthesia Plan  ASA Score- 2     Anesthesia Type- IV sedation with anesthesia with ASA Monitors.         Additional Monitors:     Airway Plan:            Plan Factors-Exercise tolerance (METS): >4 METS.    Chart reviewed.    Patient summary reviewed.    Patient is not a current smoker.              Induction-     Postoperative Plan-     Perioperative Resuscitation Plan - Level 1 - Full Code.       Informed Consent- Anesthetic plan and risks discussed with patient.  I personally reviewed this patient with the CRNA. Discussed and agreed on the Anesthesia Plan with the CRNA..      NPO Status:  No vitals data found for the desired time range.

## 2025-03-05 NOTE — ANESTHESIA POSTPROCEDURE EVALUATION
Post-Op Assessment Note    CV Status:  Stable  Pain Score: 0    Pain management: adequate    Multimodal analgesia used between 6 hours prior to anesthesia start to PACU discharge    Mental Status:  Alert and awake   Hydration Status:  Euvolemic and stable   PONV Controlled:  Controlled   Airway Patency:  Patent  Two or more mitigation strategies used for obstructive sleep apnea   Post Op Vitals Reviewed: Yes    No anethesia notable event occurred.    Staff: CRNA, Anesthesiologist           Last Filed PACU Vitals:  Vitals Value Taken Time   Temp 97    Pulse 90    /81    Resp 12    SpO2 96        Modified Isabelle:     Vitals Value Taken Time   Activity 2 03/05/25 0851   Respiration 2 03/05/25 0851   Circulation 2 03/05/25 0851   Consciousness 2 03/05/25 0851   Oxygen Saturation 2 03/05/25 0851     Modified Isabelle Score: 10

## 2025-03-05 NOTE — ANESTHESIA POSTPROCEDURE EVALUATION
Post-Op Assessment Note    CV Status:  Stable  Pain Score: 0    Pain management: adequate    Multimodal analgesia used between 6 hours prior to anesthesia start to PACU discharge    Mental Status:  Alert and awake   Hydration Status:  Euvolemic and stable   PONV Controlled:  Controlled   Airway Patency:  Patent  Two or more mitigation strategies used for obstructive sleep apnea   Post Op Vitals Reviewed: Yes    No anethesia notable event occurred.    Staff: CRNA           Last Filed PACU Vitals:  Vitals Value Taken Time   Temp 97    Pulse 90    /81    Resp 12    SpO2 96

## 2025-03-06 ENCOUNTER — OFFICE VISIT (OUTPATIENT)
Dept: PHYSICAL THERAPY | Facility: CLINIC | Age: 65
End: 2025-03-06
Payer: MEDICARE

## 2025-03-06 DIAGNOSIS — M54.2 CERVICALGIA: ICD-10-CM

## 2025-03-06 DIAGNOSIS — G89.29 CHRONIC LEFT SHOULDER PAIN: ICD-10-CM

## 2025-03-06 DIAGNOSIS — M25.511 CHRONIC RIGHT SHOULDER PAIN: Primary | ICD-10-CM

## 2025-03-06 DIAGNOSIS — G89.29 CHRONIC RIGHT SHOULDER PAIN: Primary | ICD-10-CM

## 2025-03-06 DIAGNOSIS — M25.512 CHRONIC LEFT SHOULDER PAIN: ICD-10-CM

## 2025-03-06 PROCEDURE — 97140 MANUAL THERAPY 1/> REGIONS: CPT

## 2025-03-06 PROCEDURE — 97110 THERAPEUTIC EXERCISES: CPT

## 2025-03-06 NOTE — PROGRESS NOTES
Daily Note     Today's date: 3/6/2025  Patient name: Catracho Singleton  : 1960  MRN: [id number]  Referring provider: WILLEM George  Dx:   Encounter Diagnosis     ICD-10-CM    1. Chronic right shoulder pain  M25.511     G89.29       2. Chronic left shoulder pain  M25.512     G89.29       3. Cervicalgia  M54.2                      Subjective: Pt reports shoulders feel about the same - he sees Dr. Casey for a consult in 1 week      Objective: See treatment diary below      Assessment: Patient tolerated treatment well overall. He continues to have endrange shoulder discomfort and is primarily limited in abduction ROM that improves with PROM. He does have a consult with Dr. Casey with Orthopedic Surgery next week. Will discuss how this consult went at next PT visit.      Plan: discuss how consult went with ortho     Precautions: none  Functional Limitations: sleeping - unable to sleep more than one hour to one and a half hours before being woken up by pain; unable to bench press/lift like he used to  Impairments: cervical extension ROM, bilateral cervical rotation ROM, shoulder flexion and abduction AROM  Med[employer] Code: [email or web address]   POC expiration: 2025  FOTO intake: 54  FOTO status:   FOTO predicted by visit 10: 70      POC Expires Reeval for Medicare to be completed  Unit Limit Auth Expiration Date PT/OT/STVisit Limit   2025 By visit 24 N/a BOMN BOMN    Completed on visit 14                   Auth Status DATE 2/13 2/15 2/20 2/25 2/27 3/ 3/     NA Visit # 10 11 12 13 14 15 16      Remaining            MANUAL THERAPY            Supine cervical PA mobs grade 1/2            Supine SOR            Supine cervical distraction                        Prone thoracic mobs grade 2/3            Supine R shoulder PROM B/l 20' B/L 20' B/l  20' B/l  20' B/l 20' B/l 20' B/L 20'     Supine R GHJ AP mob grade 3/4  4'          Supine R GHJ AP MWM abd                        THERAPEUTIC EXERCISE HEP            UBE?              Seated cervical retraction 11/22                         Sidelying open book 11/29 X10 ea 10ea X10 ea X10 ea nv X10 ea 10ea     Sidelying ER  X10 ea     X10 ea      Sidelying abd  X10 ea 10ea X10 ea X10 ea  X10 ea      Standing scaption iso             Wall slides shld flex?             Post capsule stretch cross body             Doorway pec stretch             AA Supine Flx OH with cane  x20 nv X20 x20  KB OH  7.5#  10x2 KB OH 7.5# 2x10                  Bent over row  15# 10x2 ea 15# 2x10ea 15# 2x10ea 15# 2x10ea 15# 2x10ea       Bent over shld ext  8# 10x2 ea 8# 2x10ea 8# 2x10ea 8# 2x10ea 8# 2x10ea       Bent over horz abd             CC lat pulldown  110# 2x10 121# 2x10 121# 2x10 121# 2x10 132# 2x10 132# 2x10 132# 2x10     CC row  110# 2x10 121# 2x10 121# 2x10 121# 2x10 132# 2x10 132# 2x10 132# 2x10     Barbell row             OHP  5# 10x2 ea MTB 20x 5#  10x2 5# 2x10 5# 2x10                    NEUROMUSCULAR REEDUCATION              Standing shld ER iso                                       TB row             TB shld ext             TB ER       B/l ER  BTB  10x2 B/l ER BTB 2x10     TB horz abd  MTB x20 MTB 20x MTB  10x2    MTB 2x10     TB Horz ABD with op arm press out     MTB  10x2 MTB 2x10  MTB  10x2 MTB 2x10                                                                                   THERAPEUTIC ACTIVITY                                                                 GAIT TRAINING                                                                 MODALITIES

## 2025-03-10 PROCEDURE — 88305 TISSUE EXAM BY PATHOLOGIST: CPT | Performed by: PATHOLOGY

## 2025-03-11 ENCOUNTER — APPOINTMENT (OUTPATIENT)
Dept: PHYSICAL THERAPY | Facility: CLINIC | Age: 65
End: 2025-03-11
Payer: MEDICARE

## 2025-03-11 ENCOUNTER — RESULTS FOLLOW-UP (OUTPATIENT)
Dept: GASTROENTEROLOGY | Facility: CLINIC | Age: 65
End: 2025-03-11

## 2025-03-13 ENCOUNTER — OFFICE VISIT (OUTPATIENT)
Dept: OBGYN CLINIC | Facility: CLINIC | Age: 65
End: 2025-03-13
Payer: MEDICARE

## 2025-03-13 VITALS — WEIGHT: 290 LBS | HEIGHT: 77 IN | BODY MASS INDEX: 34.24 KG/M2

## 2025-03-13 DIAGNOSIS — M67.912 BILATERAL ROTATOR CUFF DYSFUNCTION: Primary | ICD-10-CM

## 2025-03-13 DIAGNOSIS — M67.911 BILATERAL ROTATOR CUFF DYSFUNCTION: Primary | ICD-10-CM

## 2025-03-13 PROCEDURE — 20610 DRAIN/INJ JOINT/BURSA W/O US: CPT | Performed by: STUDENT IN AN ORGANIZED HEALTH CARE EDUCATION/TRAINING PROGRAM

## 2025-03-13 PROCEDURE — 99204 OFFICE O/P NEW MOD 45 MIN: CPT | Performed by: STUDENT IN AN ORGANIZED HEALTH CARE EDUCATION/TRAINING PROGRAM

## 2025-03-13 RX ORDER — LIDOCAINE HYDROCHLORIDE 10 MG/ML
4 INJECTION, SOLUTION INFILTRATION; PERINEURAL
Status: COMPLETED | OUTPATIENT
Start: 2025-03-13 | End: 2025-03-13

## 2025-03-13 RX ORDER — TRIAMCINOLONE ACETONIDE 40 MG/ML
40 INJECTION, SUSPENSION INTRA-ARTICULAR; INTRAMUSCULAR
Status: COMPLETED | OUTPATIENT
Start: 2025-03-13 | End: 2025-03-13

## 2025-03-13 RX ADMIN — LIDOCAINE HYDROCHLORIDE 4 ML: 10 INJECTION, SOLUTION INFILTRATION; PERINEURAL at 08:00

## 2025-03-13 RX ADMIN — TRIAMCINOLONE ACETONIDE 40 MG: 40 INJECTION, SUSPENSION INTRA-ARTICULAR; INTRAMUSCULAR at 08:00

## 2025-03-13 NOTE — PROGRESS NOTES
Ortho Sports Medicine Shoulder New Patient Visit     Assesment:   64 y.o. male bilateral rotator cuff dysfunction    Plan:    All presents today for initial consultation of bilateral shoulder pain has been ongoing for approximately 1 year with no specific mechanism of injury.  After review of his history and imaging, as well as a thorough physical exam, he appears to have some form of dysfunction to his rotator cuff tendon presenting with pain and weakness.  Given that he has failed with conservative treatment, I offered him a cortisone injections at today's visit.  I discussed the risk and benefits of these injections, Fly agreed to bilateral injections which she tolerated well with no immediate complications.  If he does not experiencing any relief within 2 months, I will consider ordering an MRI to assess the integrity of his rotator cuffs.  In the meantime, I recommend he continue with outpatient physical therapy.  I also recommend that he do his best to avoid painful activity.  All the patient's question concerns were addressed at today's visit.  I would like to see him back in approximately 2 months time or sooner for reevaluation.    Conservative treatment:    Ice to shoulder 1-2 times daily, for 20 minutes at a time.  PT for ROM and strengthening to shoulder, rotator cuff, scapular stabilizers.      Imaging:    All imaging from today was reviewed by myself and explained to the patient.       Injection:    The risks and benefits of the injection (which include but are not limited to: infection, bleeding,damage to nerve/artery, need for further intervention), as well as the risks and benefits of all alternative treatments were explained and understood.  The patient elected to proceed with injection. The procedure was done with aseptic technique, and the patient tolerated the procedure well with no complications.  Bilateral corticosteroid injection of the subacromial space was performed.    Large joint  "arthrocentesis: bilateral subacromial bursa  Rudd Protocol:  procedure performed by consultantConsent: Verbal consent obtained.  Risks and benefits: risks, benefits and alternatives were discussed  Consent given by: patient  Time out: Immediately prior to procedure a \"time out\" was called to verify the correct patient, procedure, equipment, support staff and site/side marked as required.  Patient understanding: patient states understanding of the procedure being performed  Site marked: the operative site was marked  Patient identity confirmed: verbally with patient  Supporting Documentation  Indications: pain and diagnostic evaluation   Procedure Details  Location: shoulder - bilateral subacromial bursa  Needle size: 22 G  Ultrasound guidance: no  Approach: posterior    Medications (Right): 4 mL lidocaine 1 %; 40 mg triamcinolone acetonide 40 mg/mLMedications (Left): 4 mL lidocaine 1 %; 40 mg triamcinolone acetonide 40 mg/mL   Patient tolerance: patient tolerated the procedure well with no immediate complications  Dressing:  Sterile dressing applied           Surgery:     No surgery is recommended at this point, continue with conservative treatment plan as noted.      Follow up:    Return in about 2 months (around 5/13/2025) for follow up wt Dr. Casey.        Chief Complaint   Patient presents with    Left Shoulder - Pain     Pain is worse at night     Right Shoulder - Pain     Pain is worse at night        History of Present Illness:    The patient is a 64 y.o., right hand dominant male whose occupation is unknown, referred to me by physical therapy, seen in clinic for consultation of bilateral shoulder pain.  He states this pain is been ongoing for approximately 1 year.  He denies any specific mechanism of injury or trauma to the shoulders.  He states he has been in outpatient physical therapy with no relief.  He states that any overhead activity increases his pain.  He states he has no relief with " over-the-counter pain medication, but does receive minimal relief from THC Gummies.  He denies having any steroid injections in the past.  He denies being a diabetic.  He denies any numbness tingling at this time.        Shoulder Surgical History:  None    Past Medical, Social and Family History:  Past Medical History:   Diagnosis Date    Hypertension      Past Surgical History:   Procedure Laterality Date    COLONOSCOPY       No Known Allergies  Current Outpatient Medications on File Prior to Visit   Medication Sig Dispense Refill    lisinopril (ZESTRIL) 20 mg tablet Take 20 mg by mouth daily       No current facility-administered medications on file prior to visit.     Social History     Socioeconomic History    Marital status: Unknown     Spouse name: Not on file    Number of children: Not on file    Years of education: Not on file    Highest education level: Not on file   Occupational History    Not on file   Tobacco Use    Smoking status: Former     Types: Cigarettes    Smokeless tobacco: Never   Vaping Use    Vaping status: Never Used   Substance and Sexual Activity    Alcohol use: Yes     Comment: occ    Drug use: Never    Sexual activity: Not on file   Other Topics Concern    Not on file   Social History Narrative    Not on file     Social Drivers of Health     Financial Resource Strain: Not on file   Food Insecurity: Not on file   Transportation Needs: Not on file   Physical Activity: Not on file   Stress: Not on file   Social Connections: Not on file   Intimate Partner Violence: Not on file   Housing Stability: Not on file         I have reviewed the past medical, surgical, social and family history, medications and allergies as documented in the EMR.    Review of systems: ROS is negative other than that noted in the HPI.  Constitutional: Negative for fatigue and fever.   HENT: Negative for sore throat.    Respiratory: Negative for shortness of breath.    Cardiovascular: Negative for chest pain.  "  Gastrointestinal: Negative for abdominal pain.   Endocrine: Negative for cold intolerance and heat intolerance.   Genitourinary: Negative for flank pain.   Musculoskeletal: Negative for back pain.   Skin: Negative for rash.   Allergic/Immunologic: Negative for immunocompromised state.   Neurological: Negative for dizziness.   Psychiatric/Behavioral: Negative for agitation.      Physical Exam:    Height 6' 5\" (1.956 m), weight 132 kg (290 lb).    General/Constitutional: NAD, well developed, well nourished  HENT: Normocephalic, atraumatic  CV: Intact distal pulses, regular rate  Resp: No respiratory distress or labored breathing  Lymphatic: No lymphadenopathy palpated  Neuro: Alert and Oriented x 3, no focal deficits  Psych: Normal mood, normal affect, normal judgement, normal behavior  Skin: Warm, dry, no rashes, no erythema      Shoulder focused exam:     Visual inspection of the shoulder demonstrates normal contour without atrophy  No evidence of scapular dyskinesia or atrophy.  No scapular winging  Active and passive range of motion demonstrates forward flexion to 180, abduction to 120, external rotation is approximately 80 with the arm in 90° of abduction, external rotation is 45 with the arm the side, internal rotation to T8.   Rotator cuff strength is 4/5 to resisted forward flexion, 5/5 resisted abduction, 4/5 resisted external rotation, 5/5 resisted internal rotation.  No tenderness to palpation at the distal clavicle, AC joint, acromion, or scapular spine  No pain with cross-body adduction  - Neer's test, + modified Ritchie impingement test  Negative external rotation lag sign  Negative belly press, negative lift-off  - speed's and Yergason's test  + tenderness to palpation at the bicipital groove  - [place]'s test  - Painful arc         UE NV Exam: +2 Radial pulses bilaterally  Sensation intact to light touch C5-T1 bilaterally, Radial/median/ulnar nerve motor intact      Bilateral elbow, wrist, and and " forearm ROM full, painless with passive ROM, no ttp or crepitance throughout extremities below shoulder joint    Cervical ROM is full without pain, numbness or tingling      Shoulder Imaging    X-rays of the right shoulder were reviewed, which demonstrate mild degenerative changes of the glenohumeral joint with joint space narrowing and marginal osteophytes.  No acute osseous abnormalities.  I have reviewed the radiology report and do not currently have a radiology reading from [facility], but will check the result once the reading is performed.    X-rays of the left shoulder were reviewed, which demonstrate mild degenerative changes with no significant joint space narrowing of the glenohumeral joint.  There also appears to be a small calcification along the superolateral aspect of the shoulder indicating possible calcific tendinitis.  I have reviewed the radiology report and do not currently have a radiology reading from [facility], but will check the result once the reading is performed.      Scribe Attestation      I,:  Nimesh Collado am acting as a scribe while in the presence of the attending physician.:       I,:  Santiago Casey, DO personally performed the services described in this documentation    as scribed in my presence.:

## 2025-03-18 ENCOUNTER — OFFICE VISIT (OUTPATIENT)
Dept: PHYSICAL THERAPY | Facility: CLINIC | Age: 65
End: 2025-03-18
Payer: MEDICARE

## 2025-03-18 DIAGNOSIS — G89.29 CHRONIC LEFT SHOULDER PAIN: ICD-10-CM

## 2025-03-18 DIAGNOSIS — M25.512 CHRONIC LEFT SHOULDER PAIN: ICD-10-CM

## 2025-03-18 DIAGNOSIS — G89.29 CHRONIC RIGHT SHOULDER PAIN: Primary | ICD-10-CM

## 2025-03-18 DIAGNOSIS — M25.511 CHRONIC RIGHT SHOULDER PAIN: Primary | ICD-10-CM

## 2025-03-18 PROCEDURE — 97110 THERAPEUTIC EXERCISES: CPT

## 2025-03-18 PROCEDURE — 97140 MANUAL THERAPY 1/> REGIONS: CPT

## 2025-03-18 NOTE — PROGRESS NOTES
Daily Note     Today's date: 3/18/2025  Patient name: Catracho Singleton  : 1960  MRN: [id number]  Referring provider: WILLEM George  Dx:   Encounter Diagnosis     ICD-10-CM    1. Chronic right shoulder pain  M25.511     G89.29       2. Chronic left shoulder pain  M25.512     G89.29                      Subjective: Pt reports he has been feeling great ever since the injections received by Dr Casey last week- he has been sleeper better has hasn't had the constant ache.       Objective: See treatment diary below      Assessment: Patient tolerated treatment well overall. Pt PROM is virtually pain free on both sides in all directions today but still slightly limited in flexion and ABD. He tolerated all TE's with no discomfort.  Will discuss how this consult went at next PT visit.      Plan: Continue to strengthen and improve mobility     Precautions: none  Functional Limitations: sleeping - unable to sleep more than one hour to one and a half hours before being woken up by pain; unable to bench press/lift like he used to  Impairments: cervical extension ROM, bilateral cervical rotation ROM, shoulder flexion and abduction AROM  MedBridge Code: [email or web address]   POC expiration: 2025  FOTO intake: 54  FOTO status:   FOTO predicted by visit 10: 70      POC Expires Reeval for Medicare to be completed  Unit Limit Auth Expiration Date PT/OT/STVisit Limit   2025 By visit 24 N/a BOMN BOMN    Completed on visit 14                   Auth Status DATE 2/13 2/15 2/20 2/25 2/27 3/4 3/6 3/18    NA Visit # 10 11 12 13 14 15 16 17     Remaining            MANUAL THERAPY            Supine cervical PA mobs grade 1/2            Supine SOR            Supine cervical distraction                        Prone thoracic mobs grade 2/3            Supine R shoulder PROM B/l 20' B/L 20' B/l  20' B/l  20' B/l 20' B/l 20' B/L 20' B/L 20'    Supine R GHJ AP mob grade 3/4  4'          Supine R GHJ AP MWM abd                         THERAPEUTIC EXERCISE HEP            UBE?             Seated cervical retraction 11/22                         Sidelying open book 11/29 X10 ea 10ea X10 ea X10 ea nv X10 ea 10ea X10 ea    Sidelying ER  X10 ea     X10 ea      Sidelying abd  X10 ea 10ea X10 ea X10 ea  X10 ea      Standing scaption iso             Wall slides shld flex?             Post capsule stretch cross body             Doorway pec stretch             AA Supine Flx OH with cane  x20 nv X20 x20  KB OH  7.5#  10x2 KB OH 7.5# 2x10 KB OH 7.5# 2x10                 Bent over row  15# 10x2 ea 15# 2x10ea 15# 2x10ea 15# 2x10ea 15# 2x10ea   15# 10x2 ea    Bent over shld ext  8# 10x2 ea 8# 2x10ea 8# 2x10ea 8# 2x10ea 8# 2x10ea   8# 10x2 ea    Bent over horz abd             CC lat pulldown  110# 2x10 121# 2x10 121# 2x10 121# 2x10 132# 2x10 132# 2x10 132# 2x10 132# 2x10    CC row  110# 2x10 121# 2x10 121# 2x10 121# 2x10 132# 2x10 132# 2x10 132# 2x10 132# 2x10    Barbell row             OHP  5# 10x2 ea MTB 20x 5#  10x2 5# 2x10 5# 2x10   8# 10x2 ea                 NEUROMUSCULAR REEDUCATION              Standing shld ER iso                                       TB row             TB shld ext             TB ER       B/l ER  BTB  10x2 B/l ER BTB 2x10     TB horz abd  MTB x20 MTB 20x MTB  10x2    MTB 2x10     TB Horz ABD with op arm press out     MTB  10x2 MTB 2x10  MTB  10x2 MTB 2x10                                                                                   THERAPEUTIC ACTIVITY                                                                 GAIT TRAINING                                                                 MODALITIES

## 2025-03-25 ENCOUNTER — APPOINTMENT (OUTPATIENT)
Dept: PHYSICAL THERAPY | Facility: CLINIC | Age: 65
End: 2025-03-25
Payer: MEDICARE

## 2025-03-27 ENCOUNTER — APPOINTMENT (OUTPATIENT)
Dept: PHYSICAL THERAPY | Facility: CLINIC | Age: 65
End: 2025-03-27
Payer: MEDICARE

## 2025-06-05 VITALS — HEIGHT: 77 IN | WEIGHT: 290 LBS | BODY MASS INDEX: 34.24 KG/M2

## 2025-06-05 DIAGNOSIS — M67.911 BILATERAL ROTATOR CUFF DYSFUNCTION: Primary | ICD-10-CM

## 2025-06-05 DIAGNOSIS — M67.912 BILATERAL ROTATOR CUFF DYSFUNCTION: Primary | ICD-10-CM

## 2025-06-05 PROCEDURE — 99213 OFFICE O/P EST LOW 20 MIN: CPT | Performed by: STUDENT IN AN ORGANIZED HEALTH CARE EDUCATION/TRAINING PROGRAM

## 2025-06-05 PROCEDURE — 20610 DRAIN/INJ JOINT/BURSA W/O US: CPT

## 2025-06-05 RX ORDER — PREDNISONE 20 MG/1
TABLET ORAL
COMMUNITY
Start: 2025-05-22 | End: 2025-06-05

## 2025-06-05 RX ORDER — BUPIVACAINE HYDROCHLORIDE 2.5 MG/ML
4 INJECTION, SOLUTION INFILTRATION; PERINEURAL
Status: COMPLETED | OUTPATIENT
Start: 2025-06-05 | End: 2025-06-05

## 2025-06-05 RX ORDER — TRIAMCINOLONE ACETONIDE 40 MG/ML
40 INJECTION, SUSPENSION INTRA-ARTICULAR; INTRAMUSCULAR
Status: COMPLETED | OUTPATIENT
Start: 2025-06-05 | End: 2025-06-05

## 2025-06-05 RX ADMIN — TRIAMCINOLONE ACETONIDE 40 MG: 40 INJECTION, SUSPENSION INTRA-ARTICULAR; INTRAMUSCULAR at 15:30

## 2025-06-05 RX ADMIN — BUPIVACAINE HYDROCHLORIDE 4 ML: 2.5 INJECTION, SOLUTION INFILTRATION; PERINEURAL at 15:30

## 2025-06-05 NOTE — PROGRESS NOTES
"[facility] [place]  [address]  [facility] [address]  [place] [address]  [phone]       Catracho Singleton  [id number]  1960    ORTHOPAEDIC SURGERY OUTPATIENT NOTE  6/5/2025      :  Assessment & Plan  Bilateral rotator cuff dysfunction  Discussed that due to previous relief from bilateral corticosteroid inejction, recommended repeat injection  Bilateral subacromial corticosteroid injections given without complication and was well tolerated  OTC pain medication as needed  Follow up in 3 month for repeat injection or as needed         Large joint arthrocentesis: R subacromial bursa    Performed by: Cheri Mathews PA-C  Authorized by: Santiago Casey DO    Universal Protocol:  Consent: Verbal consent obtained. Written consent not obtained  Risks and benefits: risks, benefits and alternatives were discussed  Consent given by: patient  Time out: Immediately prior to procedure a \"time out\" was called to verify the correct patient, procedure, equipment, support staff and site/side marked as required.  Timeout called at: 6/5/2025 4:16 PM.  Patient understanding: patient states understanding of the procedure being performed  Relevant documents: relevant documents present and verified  Test results: test results available and properly labeled  Site marked: the operative site was marked  Radiology Images displayed and confirmed. If images not available, report reviewed: imaging studies available  Patient identity confirmed: verbally with patient, provided demographic data and hospital-assigned identification number  Supporting Documentation  Indications: pain and joint swelling     Is this a Visco injection? NoProcedure Details  Location: shoulder - R subacromial bursa  Preparation: Patient was prepped and draped in the usual sterile fashion  Needle size: 22 G  Ultrasound guidance: no  Approach: posterior  Medications administered: 4 mL bupivacaine 0.25 %; 40 mg " "triamcinolone acetonide 40 mg/mL    Patient tolerance: patient tolerated the procedure well with no immediate complications  Dressing:  Sterile dressing applied      Large joint arthrocentesis: L subacromial bursa    Performed by: Cheri Mathews PA-C  Authorized by: Santiago Casey DO    Universal Protocol:  Consent: Verbal consent obtained. Written consent not obtained  Risks and benefits: risks, benefits and alternatives were discussed  Consent given by: patient  Time out: Immediately prior to procedure a \"time out\" was called to verify the correct patient, procedure, equipment, support staff and site/side marked as required.  Timeout called at: 6/5/2025 4:16 PM.  Patient understanding: patient states understanding of the procedure being performed  Relevant documents: relevant documents present and verified  Test results: test results available and properly labeled  Site marked: the operative site was marked  Radiology Images displayed and confirmed. If images not available, report reviewed: imaging studies available  Patient identity confirmed: verbally with patient, provided demographic data and hospital-assigned identification number  Supporting Documentation  Indications: pain and joint swelling     Is this a Visco injection? NoProcedure Details  Location: shoulder - L subacromial bursa  Preparation: Patient was prepped and draped in the usual sterile fashion  Needle size: 22 G  Ultrasound guidance: no  Approach: posterior  Medications administered: 4 mL bupivacaine 0.25 %; 40 mg triamcinolone acetonide 40 mg/mL    Patient tolerance: patient tolerated the procedure well with no immediate complications  Dressing:  Sterile dressing applied             Translation: No    HISTORY:  65 y.o. male  who is present in office for follow up rotator cuff dysfunction. He was given bilateral subacromial corticosteroid injection in 3/25, he notes that his last injection helped for 2.5 months and has since started to return, " "he notes that he had significant improvement since last injection and was able to paint his whole house. Denies any new injuries, pain is rated to be a 4/10 dull achy    Surgical History:  None    Previous Injection(s): Bilateral subacromial corticosteroid injection 3/25      The following portions of the patient's history were reviewed and updated as appropriate: allergies, current medications, past family history, past social history, past surgical history and problem list.    Ht 6' 5\" (1.956 m)   Wt 132 kg (290 lb)   BMI 34.39 kg/m²    No results found for: \"HGBA1C\"      Past Medical History[1]    Past Surgical History[2]    Social History     Socioeconomic History    Marital status: Unknown     Spouse name: Not on file    Number of children: Not on file    Years of education: Not on file    Highest education level: Not on file   Occupational History    Not on file   Tobacco Use    Smoking status: Former     Types: Cigarettes    Smokeless tobacco: Never   Vaping Use    Vaping status: Never Used   Substance and Sexual Activity    Alcohol use: Yes     Comment: occ    Drug use: Never    Sexual activity: Not on file   Other Topics Concern    Not on file   Social History Narrative    Not on file     Social Drivers of Health     Financial Resource Strain: Not on file   Food Insecurity: Not on file   Transportation Needs: Not on file   Physical Activity: Not on file   Stress: Not on file   Social Connections: Not on file   Intimate Partner Violence: Not on file   Housing Stability: Not on file       Family History[3]     Patient's Medications   New Prescriptions    No medications on file   Previous Medications    LISINOPRIL (ZESTRIL) 20 MG TABLET    Take 20 mg by mouth in the morning.   Modified Medications    No medications on file   Discontinued Medications    PREDNISONE 20 MG TABLET           Allergies[4]       REVIEW OF SYSTEMS:  Constitutional: Negative.    HEENT: Negative.    Respiratory: Negative.    Skin: " "Negative.    Neurological: Negative.    Psychiatric/Behavioral: Negative.  Musculoskeletal: Negative except for that mentioned in the HPI.    Gen: No acute distress, resting comfortably in bed  HEENT: Eyes clear, moist mucus membranes, hearing intact  Respiratory: No audible wheezing or stridor  Cardiovascular: Well Perfused peripherally, 2+ distal pulse  Abdomen: nondistended, no peritoneal signs     PHYSICAL EXAM:      Bilateral Shoulder Exam  Visual inspection of the shoulder demonstrates normal contour without atrophy  No evidence of scapular dyskinesia or atrophy.  No scapular winging  Active and passive range of motion demonstrates forward flexion to 180, abduction to 120, external rotation is approximately 80 with the arm in 90° of abduction, external rotation is 45 with the arm the side, internal rotation to T8.   Rotator cuff strength is 4+/5 to resisted forward flexion, 5/5 resisted abduction, 4+/5 resisted external rotation, 5/5 resisted internal rotation.  No tenderness to palpation at the distal clavicle, AC joint, acromion, or scapular spine  No pain with cross-body adduction  - Neer's test, + modified Ritchie impingement test  Negative external rotation lag sign  Negative belly press, negative lift-off  - speed's and Yergason's test  + tenderness to palpation at the bicipital groove  - [place]'s test  - Painful arc         Cheri Mathews PA-C    Scribe Attestation      I,:  Cheri Mathews PA-C am acting as a scribe while in the presence of the attending physician.:       I,:  Santiago Casey, DO personally performed the services described in this documentation    as scribed in my presence.:               Portions of the record may have been created with voice recognition software.  Occasional wrong word or \"sound a like\" substitutions may have occurred due to the inherent limitations of voice recognition software.  Read the chart carefully and recognize, using context, where substitutions have occurred. " All patient's questions were answered to their satisfaction.           [1]   Past Medical History:  Diagnosis Date    Hypertension    [2]   Past Surgical History:  Procedure Laterality Date    COLONOSCOPY     [3] No family history on file.  [4] No Known Allergies